# Patient Record
Sex: FEMALE | Race: WHITE | Employment: UNEMPLOYED | URBAN - METROPOLITAN AREA
[De-identification: names, ages, dates, MRNs, and addresses within clinical notes are randomized per-mention and may not be internally consistent; named-entity substitution may affect disease eponyms.]

---

## 2018-04-19 ENCOUNTER — HOSPITAL ENCOUNTER (OUTPATIENT)
Dept: MRI IMAGING | Age: 38
Discharge: HOME | End: 2018-04-19
Attending: INTERNAL MEDICINE
Payer: OTHER GOVERNMENT

## 2018-04-19 ENCOUNTER — HOSPITAL ENCOUNTER (OUTPATIENT)
Dept: ULTRASOUND IMAGING | Age: 38
Discharge: HOME | End: 2018-04-19
Attending: INTERNAL MEDICINE
Payer: OTHER GOVERNMENT

## 2018-04-19 DIAGNOSIS — K80.20 CHOLELITHIASIS: ICD-10-CM

## 2018-04-19 DIAGNOSIS — M79.672 LEFT FOOT PAIN: ICD-10-CM

## 2018-04-19 PROCEDURE — 73718 MRI LOWER EXTREMITY W/O DYE: CPT

## 2018-04-19 PROCEDURE — 76705 ECHO EXAM OF ABDOMEN: CPT

## 2018-07-03 ENCOUNTER — HOSPITAL ENCOUNTER (OUTPATIENT)
Dept: ULTRASOUND IMAGING | Age: 38
Discharge: HOME | End: 2018-07-03
Attending: INTERNAL MEDICINE
Payer: OTHER GOVERNMENT

## 2018-07-03 DIAGNOSIS — R33.9 URINARY RETENTION: ICD-10-CM

## 2018-07-03 PROCEDURE — 76857 US EXAM PELVIC LIMITED: CPT

## 2018-07-11 PROBLEM — E66.01 SEVERE OBESITY (BMI 35.0-39.9): Status: ACTIVE | Noted: 2018-07-11

## 2019-05-22 ENCOUNTER — HOSPITAL ENCOUNTER (OUTPATIENT)
Dept: ULTRASOUND IMAGING | Age: 39
Discharge: HOME | End: 2019-05-22
Attending: INTERNAL MEDICINE
Payer: OTHER GOVERNMENT

## 2019-05-22 DIAGNOSIS — R74.8 ACID PHOSPHATASE ELEVATED: ICD-10-CM

## 2019-05-22 PROCEDURE — 76705 ECHO EXAM OF ABDOMEN: CPT

## 2019-09-19 ENCOUNTER — HOSPITAL ENCOUNTER (OUTPATIENT)
Dept: CT IMAGING | Age: 39
Discharge: HOME | End: 2019-09-19
Attending: INTERNAL MEDICINE
Payer: OTHER GOVERNMENT

## 2019-09-19 DIAGNOSIS — L04.9 ACUTE LYMPHADENITIS: ICD-10-CM

## 2019-09-19 PROCEDURE — 70491 CT SOFT TISSUE NECK W/DYE: CPT

## 2019-09-19 PROCEDURE — 74011636320 HC RX REV CODE- 636/320: Performed by: INTERNAL MEDICINE

## 2019-09-19 RX ADMIN — IOPAMIDOL 79 ML: 612 INJECTION, SOLUTION INTRAVENOUS at 13:39

## 2019-09-30 ENCOUNTER — HOSPITAL ENCOUNTER (OUTPATIENT)
Dept: ULTRASOUND IMAGING | Age: 39
Discharge: HOME | End: 2019-09-30
Attending: INTERNAL MEDICINE
Payer: OTHER GOVERNMENT

## 2019-09-30 DIAGNOSIS — L04.9 ACUTE LYMPHADENITIS: ICD-10-CM

## 2019-09-30 PROCEDURE — 88173 CYTOPATH EVAL FNA REPORT: CPT

## 2019-09-30 PROCEDURE — 88185 FLOWCYTOMETRY/TC ADD-ON: CPT

## 2019-09-30 PROCEDURE — 38505 NEEDLE BIOPSY LYMPH NODES: CPT

## 2019-09-30 PROCEDURE — 74011000250 HC RX REV CODE- 250: Performed by: RADIOLOGY

## 2019-09-30 PROCEDURE — 88172 CYTP DX EVAL FNA 1ST EA SITE: CPT

## 2019-09-30 PROCEDURE — 88184 FLOWCYTOMETRY/ TC 1 MARKER: CPT

## 2019-09-30 PROCEDURE — 88305 TISSUE EXAM BY PATHOLOGIST: CPT

## 2019-09-30 RX ORDER — LIDOCAINE HYDROCHLORIDE 10 MG/ML
10 INJECTION INFILTRATION; PERINEURAL
Status: COMPLETED | OUTPATIENT
Start: 2019-09-30 | End: 2019-09-30

## 2019-09-30 RX ADMIN — LIDOCAINE HYDROCHLORIDE 2 ML: 10 INJECTION, SOLUTION INFILTRATION; PERINEURAL at 11:26

## 2019-12-26 ENCOUNTER — APPOINTMENT (OUTPATIENT)
Dept: GENERAL RADIOLOGY | Age: 39
End: 2019-12-26
Attending: PHYSICIAN ASSISTANT
Payer: OTHER GOVERNMENT

## 2019-12-26 ENCOUNTER — HOSPITAL ENCOUNTER (EMERGENCY)
Age: 39
Discharge: HOME | End: 2019-12-26
Attending: EMERGENCY MEDICINE
Payer: OTHER GOVERNMENT

## 2019-12-26 VITALS
RESPIRATION RATE: 16 BRPM | HEIGHT: 69 IN | SYSTOLIC BLOOD PRESSURE: 126 MMHG | WEIGHT: 250 LBS | TEMPERATURE: 96.9 F | OXYGEN SATURATION: 99 % | BODY MASS INDEX: 37.03 KG/M2 | HEART RATE: 70 BPM | DIASTOLIC BLOOD PRESSURE: 85 MMHG

## 2019-12-26 DIAGNOSIS — R07.89 ANTERIOR CHEST WALL PAIN: Primary | ICD-10-CM

## 2019-12-26 PROBLEM — D32.1 SPINAL MENINGIOMA (HCC): Status: ACTIVE | Noted: 2018-01-29

## 2019-12-26 PROBLEM — H02.422 MYOGENIC PTOSIS OF LEFT EYELID: Status: ACTIVE | Noted: 2019-12-26

## 2019-12-26 PROBLEM — R20.0 NUMBNESS AND TINGLING: Status: ACTIVE | Noted: 2017-08-28

## 2019-12-26 PROBLEM — G70.00 MYASTHENIA GRAVIS (HCC): Status: ACTIVE | Noted: 2019-12-26

## 2019-12-26 PROBLEM — R20.2 NUMBNESS AND TINGLING: Status: ACTIVE | Noted: 2017-08-28

## 2019-12-26 PROBLEM — G47.33 OSA ON CPAP: Status: ACTIVE | Noted: 2018-12-20

## 2019-12-26 PROBLEM — Z99.89 OSA ON CPAP: Status: ACTIVE | Noted: 2018-12-20

## 2019-12-26 PROBLEM — N92.0 MENORRHAGIA: Status: ACTIVE | Noted: 2019-12-26

## 2019-12-26 LAB
ALBUMIN SERPL-MCNC: 4 G/DL (ref 3.4–5)
ALBUMIN/GLOB SERPL: 1.1 {RATIO} (ref 0.8–1.7)
ALP SERPL-CCNC: 57 U/L (ref 45–117)
ALT SERPL-CCNC: 120 U/L (ref 13–56)
ANION GAP SERPL CALC-SCNC: 7 MMOL/L (ref 3–18)
AST SERPL-CCNC: 86 U/L (ref 10–38)
BASOPHILS # BLD: 0.1 K/UL (ref 0–0.1)
BASOPHILS NFR BLD: 1 % (ref 0–2)
BILIRUB SERPL-MCNC: 0.8 MG/DL (ref 0.2–1)
BUN SERPL-MCNC: 11 MG/DL (ref 7–18)
BUN/CREAT SERPL: 15 (ref 12–20)
CALCIUM SERPL-MCNC: 8.6 MG/DL (ref 8.5–10.1)
CHLORIDE SERPL-SCNC: 108 MMOL/L (ref 100–111)
CK MB CFR SERPL CALC: NORMAL % (ref 0–4)
CK MB SERPL-MCNC: <1 NG/ML (ref 5–25)
CK SERPL-CCNC: 67 U/L (ref 26–192)
CO2 SERPL-SCNC: 26 MMOL/L (ref 21–32)
CREAT SERPL-MCNC: 0.72 MG/DL (ref 0.6–1.3)
DIFFERENTIAL METHOD BLD: NORMAL
EOSINOPHIL # BLD: 0.2 K/UL (ref 0–0.4)
EOSINOPHIL NFR BLD: 3 % (ref 0–5)
ERYTHROCYTE [DISTWIDTH] IN BLOOD BY AUTOMATED COUNT: 13.6 % (ref 11.6–14.5)
GLOBULIN SER CALC-MCNC: 3.8 G/DL (ref 2–4)
GLUCOSE SERPL-MCNC: 96 MG/DL (ref 74–99)
HCT VFR BLD AUTO: 41.1 % (ref 35–45)
HGB BLD-MCNC: 13.3 G/DL (ref 12–16)
LYMPHOCYTES # BLD: 2.9 K/UL (ref 0.9–3.6)
LYMPHOCYTES NFR BLD: 41 % (ref 21–52)
MCH RBC QN AUTO: 28.7 PG (ref 24–34)
MCHC RBC AUTO-ENTMCNC: 32.4 G/DL (ref 31–37)
MCV RBC AUTO: 88.6 FL (ref 74–97)
MONOCYTES # BLD: 0.5 K/UL (ref 0.05–1.2)
MONOCYTES NFR BLD: 7 % (ref 3–10)
NEUTS SEG # BLD: 3.4 K/UL (ref 1.8–8)
NEUTS SEG NFR BLD: 48 % (ref 40–73)
PLATELET # BLD AUTO: 354 K/UL (ref 135–420)
PMV BLD AUTO: 9.6 FL (ref 9.2–11.8)
POTASSIUM SERPL-SCNC: 3.8 MMOL/L (ref 3.5–5.5)
PROT SERPL-MCNC: 7.8 G/DL (ref 6.4–8.2)
RBC # BLD AUTO: 4.64 M/UL (ref 4.2–5.3)
SODIUM SERPL-SCNC: 141 MMOL/L (ref 136–145)
TROPONIN I SERPL-MCNC: <0.02 NG/ML (ref 0–0.04)
WBC # BLD AUTO: 7.1 K/UL (ref 4.6–13.2)

## 2019-12-26 PROCEDURE — 82550 ASSAY OF CK (CPK): CPT

## 2019-12-26 PROCEDURE — 85025 COMPLETE CBC W/AUTO DIFF WBC: CPT

## 2019-12-26 PROCEDURE — 93005 ELECTROCARDIOGRAM TRACING: CPT

## 2019-12-26 PROCEDURE — 74011250637 HC RX REV CODE- 250/637: Performed by: PHYSICIAN ASSISTANT

## 2019-12-26 PROCEDURE — 99284 EMERGENCY DEPT VISIT MOD MDM: CPT

## 2019-12-26 PROCEDURE — 71045 X-RAY EXAM CHEST 1 VIEW: CPT

## 2019-12-26 PROCEDURE — 80053 COMPREHEN METABOLIC PANEL: CPT

## 2019-12-26 RX ORDER — DIAZEPAM 5 MG/1
5 TABLET ORAL
Qty: 9 TAB | Refills: 0 | Status: SHIPPED | OUTPATIENT
Start: 2019-12-26 | End: 2019-12-26

## 2019-12-26 RX ORDER — DIAZEPAM 5 MG/1
5 TABLET ORAL
Qty: 9 TAB | Refills: 0 | Status: SHIPPED | OUTPATIENT
Start: 2019-12-26

## 2019-12-26 RX ORDER — DIAZEPAM 5 MG/1
5 TABLET ORAL
Status: COMPLETED | OUTPATIENT
Start: 2019-12-26 | End: 2019-12-26

## 2019-12-26 RX ADMIN — DIAZEPAM 5 MG: 5 TABLET ORAL at 21:54

## 2019-12-27 PROBLEM — R33.9 INCOMPLETE BLADDER EMPTYING: Status: ACTIVE | Noted: 2019-12-27

## 2019-12-27 PROBLEM — N31.9 NEUROGENIC BLADDER: Status: ACTIVE | Noted: 2019-12-27

## 2019-12-27 LAB
ATRIAL RATE: 80 BPM
CALCULATED P AXIS, ECG09: 13 DEGREES
CALCULATED R AXIS, ECG10: 20 DEGREES
CALCULATED T AXIS, ECG11: 33 DEGREES
DIAGNOSIS, 93000: NORMAL
P-R INTERVAL, ECG05: 160 MS
Q-T INTERVAL, ECG07: 384 MS
QRS DURATION, ECG06: 94 MS
QTC CALCULATION (BEZET), ECG08: 442 MS
VENTRICULAR RATE, ECG03: 80 BPM

## 2019-12-27 NOTE — ED TRIAGE NOTES
Pt arrives with c/o chest spasm that lasted 5 minutes that moved up into jaw x 2.today at 1815 today. Pt states feeling better now. Pt states same happened 3 weeks ago and passed out. Hx of Mysthenia Gravis.

## 2019-12-27 NOTE — DISCHARGE INSTRUCTIONS

## 2019-12-27 NOTE — ED NOTES
I have reviewed discharge instructions with the patient. The patient and spouse verbalized understanding.   Patient armband removed and shredded

## 2019-12-27 NOTE — ED PROVIDER NOTES
EMERGENCY DEPARTMENT HISTORY AND PHYSICAL EXAM    Date: 12/26/2019  Patient Name: Guerrero Lui    History of Presenting Illness     Chief Complaint   Patient presents with    Spasms     chest         History Provided By: Patient        Additional History (Context): Guerrero Lui is a 35 y.o. female with Previous history of myasthenia gravis currently managed by her neurologist, obstructive sleep apnea and prior history of spinal meningioma who presents with a complaint of sudden onset anterior chest wall pain which radiated to her midline neck lasting approximately 5 minutes and resolved at 1800 hrs. tonight. Patient does report a history of muscle spasms however, she feels this episode was stronger than prior muscle spasms. Patient takes baclofen 3 times daily. She has not taken her dose since this episode. At time of assessment the patient symptoms have completely resolved. She denies diaphoreses during this episode however, she does report shortness of breath associated with this intense chest wall pain. Patient denies trauma to the chest, recent cough, fever, myalgias, or chills. PCP: Aylin Centeno MD    Current Outpatient Medications   Medication Sig Dispense Refill    diazePAM (VALIUM) 5 mg tablet Take 1 Tab by mouth daily as needed (severe muscle spasm). 9 Tab 0    buPROPion XL (WELLBUTRIN XL) 300 mg XL tablet take 1 tablet by mouth once daily  0    pyridostigmine (MESTINON) 60 mg tablet START WITH take 1/2 tablet by mouth three times a day for 1 week . ..  (REFER TO PRESCRIPTION NOTES).   0    prazosin (MINIPRESS) 1 mg capsule   0    diclofenac (VOLTAREN) 1 % gel   0    baclofen (LIORESAL) 20 mg tablet   0    cetirizine (ZYRTEC) 10 mg tablet       clobetasol (OLUX) 0.05 % topical foam clobetasol 0.05 % topical foam      EPINEPHrine (EPIPEN 2-YONI) 0.3 mg/0.3 mL injection EpiPen 2-Yoni 0.3 mg/0.3 mL injection, auto-injector      ergocalciferol (ERGOCALCIFEROL) 50,000 unit capsule       fluticasone (FLONASE) 50 mcg/actuation nasal spray       gabapentin (NEURONTIN) 600 mg tablet   0    tranexamic acid (LYSTEDA) 650 mg tab tablet Take 2 tablets 3 times a day by oral route for 5 days.  meloxicam (MOBIC) 15 mg tablet   0    multivitamin with minerals (MULTIVITAMIN & MINERAL FORMULA PO) multivitamin      PAZEO 0.7 % drop       carboxymethylcellulose sodium (REFRESH TEARS) 0.5 % drop ophthalmic solution instill 1 drop into affected eye if needed         Past History     Past Medical History:  Past Medical History:   Diagnosis Date    Bladder wall thickening     Clonus     Ill-defined condition     spinal cord  compression    Incomplete bladder emptying     Myasthenia gravis (HCC)     Neurogenic bladder     Obesity     MARIAH on CPAP     Stress incontinence     Vitamin D deficiency        Past Surgical History:  Past Surgical History:   Procedure Laterality Date    HX  SECTION      x3    HX OTHER SURGICAL  2017    C-4 meningioma resection    HX WISDOM TEETH EXTRACTION         Family History:  Family History   Problem Relation Age of Onset    Hypertension Mother     Seizures Sister        Social History:  Social History     Tobacco Use    Smoking status: Former Smoker    Smokeless tobacco: Former User   Substance Use Topics    Alcohol use: Not Currently     Frequency: Never    Drug use: No       Allergies: Allergies   Allergen Reactions    Other Food Shortness of Breath     Milk protein, casein, whey--throat swelling         Review of Systems   Review of Systems  Review of Systems   Constitutional: Negative for fatigue and fever. HENT: Negative for congestion. Respiratory: Negative for cough, episodic SOB at the same time of anterior chest wall pain. Cardiovascular: Positive for anterior wall chest pain of sudden onset (now resolved). Gastrointestinal: Negative for abdominal pain, diarrhea, nausea and vomiting.    Genitourinary: Negative for difficulty urinating and dysuria. Musculoskeletal: Negative joint pain, joint swelling, recent injury. Skin: Negative for wound. Patient denies diaphoreses patient denies diaphoreses  Neurological: Negative for dizziness and headaches. All other systems reviewed and are negative. All Other Systems Negative  Physical Exam     Vitals:    12/26/19 1900 12/26/19 1906 12/26/19 2128   BP: 126/85     Pulse: 70     Resp: 16     Temp: 96.9 °F (36.1 °C)     SpO2: 99%  99%   Weight:  113.4 kg (250 lb)    Height:  5' 9\" (1.753 m)      Physical Exam     Constitutional: Pt is oriented to person, place, and time. Pt appears well-developed and well-nourished. HENT:   Head: Normocephalic and atraumatic. Mouth/Throat: Oropharynx is clear and moist.   Eyes: Pupils are equal, round, and reactive to light. Neck: Normal range of motion. Neck supple. Cardiovascular: Normal rate, regular rhythm and normal heart sounds. No murmur heard. Pulmonary/Chest: Effort normal and breath sounds normal. No respiratory distress. No wheezes or rales. Chest wall pain is not reproducible but resolved. Abdominal: Soft. no distension and no mass. There is no tenderness. There is no rebound and no guarding. No Childress sign. Musculoskeletal: Normal range of motion. No edema or deformity. No edema, calor or erythema of the bilateral calves. Neurological: Pt is alert and oriented to person, place, and time   Skin: Skin is warm and dry. No diaphoreses.   Psychiatric: Pt has a normal mood and affect;  behavior is normal. Judgment and thought content normal.           Diagnostic Study Results     Labs -     Recent Results (from the past 12 hour(s))   CARDIAC PANEL,(CK, CKMB & TROPONIN)    Collection Time: 12/26/19  7:52 PM   Result Value Ref Range    CK 67 26 - 192 U/L    CK - MB <1.0 <3.6 ng/ml    CK-MB Index  0.0 - 4.0 %     CALCULATION NOT PERFORMED WHEN RESULT IS BELOW LINEAR LIMIT    Troponin-I, QT <0.02 0.0 - 0.045 NG/ML   CBC WITH AUTOMATED DIFF    Collection Time: 12/26/19  7:52 PM   Result Value Ref Range    WBC 7.1 4.6 - 13.2 K/uL    RBC 4.64 4.20 - 5.30 M/uL    HGB 13.3 12.0 - 16.0 g/dL    HCT 41.1 35.0 - 45.0 %    MCV 88.6 74.0 - 97.0 FL    MCH 28.7 24.0 - 34.0 PG    MCHC 32.4 31.0 - 37.0 g/dL    RDW 13.6 11.6 - 14.5 %    PLATELET 662 335 - 679 K/uL    MPV 9.6 9.2 - 11.8 FL    NEUTROPHILS 48 40 - 73 %    LYMPHOCYTES 41 21 - 52 %    MONOCYTES 7 3 - 10 %    EOSINOPHILS 3 0 - 5 %    BASOPHILS 1 0 - 2 %    ABS. NEUTROPHILS 3.4 1.8 - 8.0 K/UL    ABS. LYMPHOCYTES 2.9 0.9 - 3.6 K/UL    ABS. MONOCYTES 0.5 0.05 - 1.2 K/UL    ABS. EOSINOPHILS 0.2 0.0 - 0.4 K/UL    ABS. BASOPHILS 0.1 0.0 - 0.1 K/UL    DF AUTOMATED     METABOLIC PANEL, COMPREHENSIVE    Collection Time: 12/26/19  7:52 PM   Result Value Ref Range    Sodium 141 136 - 145 mmol/L    Potassium 3.8 3.5 - 5.5 mmol/L    Chloride 108 100 - 111 mmol/L    CO2 26 21 - 32 mmol/L    Anion gap 7 3.0 - 18 mmol/L    Glucose 96 74 - 99 mg/dL    BUN 11 7.0 - 18 MG/DL    Creatinine 0.72 0.6 - 1.3 MG/DL    BUN/Creatinine ratio 15 12 - 20      GFR est AA >60 >60 ml/min/1.73m2    GFR est non-AA >60 >60 ml/min/1.73m2    Calcium 8.6 8.5 - 10.1 MG/DL    Bilirubin, total 0.8 0.2 - 1.0 MG/DL    ALT (SGPT) 120 (H) 13 - 56 U/L    AST (SGOT) 86 (H) 10 - 38 U/L    Alk. phosphatase 57 45 - 117 U/L    Protein, total 7.8 6.4 - 8.2 g/dL    Albumin 4.0 3.4 - 5.0 g/dL    Globulin 3.8 2.0 - 4.0 g/dL    A-G Ratio 1.1 0.8 - 1.7         Radiologic Studies -   XR CHEST PORT    (Results Pending)     CT Results  (Last 48 hours)    None        CXR Results  (Last 48 hours)    None            Medical Decision Making   I am the first provider for this patient. I reviewed the vital signs, available nursing notes, past medical history, past surgical history, family history and social history. Vital Signs-Reviewed the patient's vital signs.        Comparison:    Records Reviewed: Nursing Notes    Procedures:  Procedures    Provider Notes (Medical Decision Making):   Patient's heart score = 0. Perc score =0. Onset of the patient's symptoms and resolved symptoms minutes after onset I suspect this was chest wall pain muscular skeletal in origin. Patient has a history of muscle spasms and takes baclofen 3 times daily I may replace this with Valium but only once a day if needed. MED RECONCILIATION:  No current facility-administered medications for this encounter. Current Outpatient Medications   Medication Sig    diazePAM (VALIUM) 5 mg tablet Take 1 Tab by mouth daily as needed (severe muscle spasm).  buPROPion XL (WELLBUTRIN XL) 300 mg XL tablet take 1 tablet by mouth once daily    pyridostigmine (MESTINON) 60 mg tablet START WITH take 1/2 tablet by mouth three times a day for 1 week . ..  (REFER TO PRESCRIPTION NOTES).  prazosin (MINIPRESS) 1 mg capsule     diclofenac (VOLTAREN) 1 % gel     baclofen (LIORESAL) 20 mg tablet     cetirizine (ZYRTEC) 10 mg tablet     clobetasol (OLUX) 0.05 % topical foam clobetasol 0.05 % topical foam    EPINEPHrine (EPIPEN 2-YONI) 0.3 mg/0.3 mL injection EpiPen 2-Yoni 0.3 mg/0.3 mL injection, auto-injector    ergocalciferol (ERGOCALCIFEROL) 50,000 unit capsule     fluticasone (FLONASE) 50 mcg/actuation nasal spray     gabapentin (NEURONTIN) 600 mg tablet     tranexamic acid (LYSTEDA) 650 mg tab tablet Take 2 tablets 3 times a day by oral route for 5 days.  meloxicam (MOBIC) 15 mg tablet     multivitamin with minerals (MULTIVITAMIN & MINERAL FORMULA PO) multivitamin    PAZEO 0.7 % drop     carboxymethylcellulose sodium (REFRESH TEARS) 0.5 % drop ophthalmic solution instill 1 drop into affected eye if needed       Disposition:  Home    DISCHARGE NOTE:     Pt has been reexamined. Patient has no new complaints, changes, or physical findings. Care plan outlined and precautions discussed. Results of exam and labs were reviewed with the patient.  All medications were reviewed with the patient; will d/c home with valium only as needed and as a substitute for baclofen. All of pt's questions and concerns were addressed. Patient was instructed and agrees to follow up with primary care, as well as to return to the ED upon further deterioration. Patient is ready to go home. Follow-up Information     Follow up With Specialties Details Why Contact Info    Bennie Corrales MD Internal Medicine Schedule an appointment as soon as possible for a visit As needed [address] [place] [address] Internal Medicine [address] [place] [address] [phone]      [facility] EMERGENCY DEPT Emergency Medicine  If symptoms worsen [address]  [phone]          Discharge Medication List as of 12/26/2019  9:47 PM      START taking these medications    Details   diazePAM (VALIUM) 5 mg tablet Take 1 Tab by mouth three (3) times daily as needed (severe muscle spasm). Max Daily Amount: 15 mg., Print, Disp-9 Tab, R-0         CONTINUE these medications which have NOT CHANGED    Details   buPROPion XL (WELLBUTRIN XL) 300 mg XL tablet take 1 tablet by mouth once daily, Historical Med, R-0      pyridostigmine (MESTINON) 60 mg tablet START WITH take 1/2 tablet by mouth three times a day for 1 week . ..  (REFER TO PRESCRIPTION NOTES). , Historical Med, R-0      prazosin (MINIPRESS) 1 mg capsule Historical Med, R-0      diclofenac (VOLTAREN) 1 % gel Historical Med, R-0      baclofen (LIORESAL) 20 mg tablet Historical Med, R-0      cetirizine (ZYRTEC) 10 mg tablet Historical Med      clobetasol (OLUX) 0.05 % topical foam clobetasol 0.05 % topical foam, Historical Med      EPINEPHrine (EPIPEN 2-YONI) 0.3 mg/0.3 mL injection EpiPen 2-Yoni 0.3 mg/0.3 mL injection, auto-injector, Historical Med      ergocalciferol (ERGOCALCIFEROL) 50,000 unit capsule Historical Med      fluticasone (FLONASE) 50 mcg/actuation nasal spray Historical Med      gabapentin (NEURONTIN) 600 mg tablet Historical Med, R-0      tranexamic acid (LYSTEDA) 650 mg tab tablet Take 2 tablets 3 times a day by oral route for 5 days. , Historical Med      meloxicam (MOBIC) 15 mg tablet Historical Med, R-0      multivitamin with minerals (MULTIVITAMIN & MINERAL FORMULA PO) multivitamin, Historical Med      PAZEO 0.7 % drop Historical Med, ROSA      carboxymethylcellulose sodium (REFRESH TEARS) 0.5 % drop ophthalmic solution instill 1 drop into affected eye if needed, Historical Med                 Diagnosis     Clinical Impression:   1.  Anterior chest wall pain

## 2020-07-01 ENCOUNTER — OFFICE VISIT (OUTPATIENT)
Dept: SURGERY | Age: 40
End: 2020-07-01

## 2020-07-01 VITALS
WEIGHT: 263 LBS | TEMPERATURE: 98.3 F | BODY MASS INDEX: 39.86 KG/M2 | OXYGEN SATURATION: 98 % | HEIGHT: 68 IN | SYSTOLIC BLOOD PRESSURE: 123 MMHG | DIASTOLIC BLOOD PRESSURE: 83 MMHG | HEART RATE: 120 BPM

## 2020-07-01 DIAGNOSIS — E66.01 MORBID OBESITY WITH BMI OF 40.0-44.9, ADULT (HCC): Primary | ICD-10-CM

## 2020-07-01 NOTE — PROGRESS NOTES
Consult    Patient: Latricia Álvarez MRN: [id number]  SSN: [id number]    YOB: 1980  Age: 38 y.o. Sex: female      Initial  Consultation for Bariatric Surgery     Latricia Álvarez is a 41-year-old white female who presents for discussion of surgical options available for the definitive management of her clinically severe obesity. Onset of obesity: Age 35 weighing 230 pounds on a 5 foot 9 inch frame. Weight at age 24: 125 pounds on a 5 foot 9 inch frame. Maximum weight: 275 pounds on a 5 foot 9 inch frame in 2019  Pattern/progression of weight gain: Slowly progressive interrupted by dietary weight loss followed by regain of the lost weight as well as additional weight thus exhibiting the yoyo effect after maximum loss of 275 pounds in 2019  Max medical weight loss attempts: Multiple unsupervised and supervised weight loss trials with maximal loss occurring in  losing 30 pounds over 6 months. Comorbidities: Gastroesophageal reflux disease, obstructive sleep apnea, weight related arthropathy-hips  Current weight: 263 BMI: 40  Ideal body weight: 146. Excess body weight: 117  Estimated postsurgical weight loss: 94  Postsurgical goal weight: 160  Allergies: No known drug allergies  Current medications: See medication list  Past medical history:  1. Clinically severe obesity with body mass index of 40 and obese related comorbidities of gastroesophageal reflux disease, obstructive sleep apnea, weight related arthropathy-hips  2. Depression/anxiety  3. History of myasthenia gravis  4. C7 spinal meningioma status post resection 2017 with neurogenic bladder  5. Allergic rhinitis  Past surgical history:  1. [place] tooth extractions   2.  sections times 3/2009, ,   3.   Resection of C7 spinal meningioma   Social history:  Tobacco: Quit smoking  with a prior history of 1 pack of cigarettes daily for 10 years  Alcohol: 1 ounce monthly  Family history: Mother 79-hypertension  Father  61-clinically severe obesity, influenza  One half sister 56-Parkinson's disease  One half sister 54-rheumatoid arthritis  Brother 38-clinically severe obesity with Gastrosoft reflux disease and liver disease of unknown etiology    Allergies   Allergen Reactions    Other Food Shortness of Breath     Milk protein, casein, whey--throat swelling       Current Outpatient Medications on File Prior to Visit   Medication Sig Dispense Refill    diazePAM (VALIUM) 5 mg tablet Take 1 Tab by mouth daily as needed (severe muscle spasm). 9 Tab 0    buPROPion XL (WELLBUTRIN XL) 300 mg XL tablet take 1 tablet by mouth once daily  0    pyridostigmine (MESTINON) 60 mg tablet START WITH take 1/2 tablet by mouth three times a day for 1 week . ..  (REFER TO PRESCRIPTION NOTES). 0    diclofenac (VOLTAREN) 1 % gel   0    cetirizine (ZYRTEC) 10 mg tablet       EPINEPHrine (EPIPEN 2-YONI) 0.3 mg/0.3 mL injection EpiPen 2-Yoni 0.3 mg/0.3 mL injection, auto-injector      ergocalciferol (ERGOCALCIFEROL) 50,000 unit capsule       fluticasone (FLONASE) 50 mcg/actuation nasal spray       gabapentin (NEURONTIN) 600 mg tablet   0    tranexamic acid (LYSTEDA) 650 mg tab tablet Take 2 tablets 3 times a day by oral route for 5 days.  multivitamin with minerals (MULTIVITAMIN & MINERAL FORMULA PO) multivitamin      PAZEO 0.7 % drop       carboxymethylcellulose sodium (REFRESH TEARS) 0.5 % drop ophthalmic solution instill 1 drop into affected eye if needed      prazosin (MINIPRESS) 1 mg capsule   0    baclofen (LIORESAL) 20 mg tablet   0    clobetasol (OLUX) 0.05 % topical foam clobetasol 0.05 % topical foam      meloxicam (MOBIC) 15 mg tablet   0     No current facility-administered medications on file prior to visit.         Past Medical History:   Diagnosis Date    Bladder wall thickening     Clonus     Ill-defined condition     spinal cord  compression    Incomplete bladder emptying     Myasthenia gravis (HCC)     Neurogenic bladder     Obesity     MARIAH on CPAP     Stress incontinence     Vitamin D deficiency        Past Surgical History:   Procedure Laterality Date    HX  SECTION      x3    HX ORTHOPAEDIC      HX OTHER SURGICAL  2017    C-4 meningioma resection    HX WISDOM TEETH EXTRACTION         Social History     Tobacco Use    Smoking status: Former Smoker    Smokeless tobacco: Former User   Substance Use Topics    Alcohol use: Yes     Frequency: Never     Comment: occ    Drug use: No       Family History   Problem Relation Age of Onset    Hypertension Mother     Seizures Sister          Review of Systems:      General: Denies fevers, chills, night sweats, fatigue, weight loss, or weight gain. HEENT: Denies changes in auditory or visual acuity, recurrent pharyngitis, epistaxis, chronic rhinorrhea, vertigo    Respiratory: Denies increasing shortness of breath, productive cough, hemoptysis    Cardiac: Denies known history of cardiac disease, heart murmur, palpitations    GI: Denies dysphagia, recurrent emesis, hematemesis, changes in bowel habits, hematochezia, melena    : Denies hematuria frequency urgency dysuria    Musculoskeletal: Denies fractures, dislocations    Neurologic: Denies history of CVA, paralysis paresthesias, recurrent cephalgia, seizures    Endocrine: Denies polyuria, polydipsia, polyphagia, heat and cold intolerance    Lymph/heme: Denies a history of malignancy, anemia, bruising, blood transfusions    Integumentary: Negative for dermatitis         Physical Exam    Visit Vitals  /83 (BP 1 Location: Right arm, BP Patient Position: Sitting)   Pulse (!) 120   Temp 98.3 °F (36.8 °C)   Ht 5' 8\" (1.727 m)   Wt 119.3 kg (263 lb)   SpO2 98%   BMI 39.99 kg/m²       Nursing note reviewed. General: Clinically severely obese in no acute distress, nontoxic in appearance.   Head: Normocephalic, atraumatic  Mouth: Clear, no overt lesions, oral mucosa is pink and moist.  Neck: Supple, no masses, no adenopathy or carotid bruits, trachea midline  Resp: Clear to auscultation bilaterally, no wheezing, rhonchi, or rales, excursions normal and symmetrical.  Cardio: Regular rate and rhythm, no murmurs, clicks, gallops, or rubs. Abdomen: Obese, soft, nontender, nondistended, normoactive bowel sounds, no hernias. Extremities: Warm, well perfused, no tenderness or swelling, normal gait/station, without edema or varicosities  Neuro: Sensation and strength grossly intact and symmetrical.  Psych: Alert and oriented to person, place, and time. Impression/Plan:    Clinically severe obesity with body mass index of 40 and obesity related comorbidities of Gastrosoft reflux disease, obstructive sleep apnea, weight related arthropathy-hips who would benefit from bariatric surgery. We have had an extensive discussion with regard to the risks, benefits and likely outcomes of the operation. We've discussed the restrictive and malabsorptive nature of the gastric bypass and compared and contrasted with the sleeve gastrectomy. The patient understands the likelihood of losing approximately 80% of their excess weight in 12 to 18 months. The patient also understands the risks including but not limited to bleeding, infection, need for reoperation, ulcers, leaks and strictures, bowel obstruction secondary to adhesions and internal hernias, DVT, PE, heart attack, stroke, and death. Patient also understands risks of inadequate weight loss, excess weight loss, vitamin insufficiency, protein malnutrition, excess skin, and loss of hair. We have reviewed the components of a successful postoperative course including requirement for a high protein, low carbohydrate diet, 60 oz a day of zero calorie liquids, daily vitamin supplementation, daily exercise, regular follow-up, and participation in support groups.  At this time we will enroll the patient in our bariatric program, undertake routine laboratory evaluation, chest X-ray, EKG, possible UGI and evaluation by  nutritionist as well as psychologist and pending their satisfactory completion of the preop evaluation, plan to pursue laparoscopic potentially open gastric bypass to achieve definitive durable weight loss on a personal level with expected resolution of obesity related comorbidities

## 2020-07-07 ENCOUNTER — DOCUMENTATION ONLY (OUTPATIENT)
Dept: SURGERY | Age: 40
End: 2020-07-07

## 2020-07-07 NOTE — PROGRESS NOTES
[facility]  [address] [facility] [place], [address] [place] [address], [id number]    Patient's Name: Victorina Khan   Age: 38 y.o. YOB: 1980   Sex: female    Date:   7/7/2020           Session: 1 of  3   Surgeon:   Dr. Daiva Aschoff     Height: 5'8\"   Weight:    263      Lbs. Starting Weight:   263   Lbs. BMI: 39       Do you smoke? no    Alcohol intake:    I drink occasionally  Very rarely. Class Guidelines    Guidelines are reviewed with patient at the start of every class. 1. Patient understands that weight loss trial classes must be consecutive. Patient understands if they miss a class, it is their responsibility to contact me to reschedule class. I will reach out to patient after their first no show. 2.  Patient understands the expectations that weight maintenance/weight loss is expected during the classes. Failure to demonstrate changes may result in one extra month of weight loss trial, followed by going back to see the surgeon. Patient understands that they CAN NOT gain any weight during the weight loss trial.  Gaining weight will result in extra classes. 3. Patient is also instructed to be doing their labs, blood work, psych visit, support group and any other test that the surgeon has used while they are working on their weight loss trial.  4.  Patient was instructed to bring their blue binder to every class and appointment. Eating Habits and Behaviors    Today in class, we started talking about the key diet principles. We first focused on stopping liquid calories. Patient was also educated on carbohydrates. Patient was instructed to start cutting out bread, rice, and pasta from the diet and start focusing more on meat and vegetables. I then gave a power point, which focused on Label Reading. In class, I gave patients a labels and we worked through a series of questions to help patients have a better understanding of label reading.   Patient was instructed to review the serving size. Patient was encouraged to focus on protein and carbohydrates. We also did a few label reading activities to help the patient become more familiar with label reading. Patient's current diet habits include:   2-3 meals consisting of a a protein source, a starch option and sometimes a vegetable options. For snacks the patient often chooses grapes, greek yogurt with strawberries, crackers. Physical Activity/Exercise    Comments: We talked about exercise. Patient was given reasons of why exercise is so important and how that can help with their long-term success. I have encouraged patient to get a support system to help with the activity. Currently for activity, patient is doing more walking. Behavior Modification       Comments:  Behavior modifications were reinforced. This included not eating in front of the TV, which could lead to bigger portions and eating when one is not hungry. We also talked about the importance of eating 3 meals per day. Patient was encouraged to food journal to keep their daily carbohydrates less than 30 grams per meal.      Goals that patient wants to work on includes:  1. Starting breakfast.    2.  Get protein in.  3.  Drink 64 oz water.         Bere Ivy RD

## 2020-07-08 ENCOUNTER — HOSPITAL ENCOUNTER (OUTPATIENT)
Dept: LAB | Age: 40
Discharge: HOME | End: 2020-07-08
Payer: OTHER GOVERNMENT

## 2020-07-08 ENCOUNTER — HOSPITAL ENCOUNTER (OUTPATIENT)
Dept: PREADMISSION TESTING | Age: 40
Discharge: HOME | End: 2020-07-08
Payer: OTHER GOVERNMENT

## 2020-07-08 ENCOUNTER — HOSPITAL ENCOUNTER (OUTPATIENT)
Dept: GENERAL RADIOLOGY | Age: 40
Discharge: HOME | End: 2020-07-08
Payer: OTHER GOVERNMENT

## 2020-07-08 DIAGNOSIS — E66.01 MORBID OBESITY WITH BMI OF 40.0-44.9, ADULT (HCC): ICD-10-CM

## 2020-07-08 LAB
25(OH)D3 SERPL-MCNC: 42.8 NG/ML (ref 30–100)
ALBUMIN SERPL-MCNC: 4 G/DL (ref 3.4–5)
ALBUMIN/GLOB SERPL: 1 {RATIO} (ref 0.8–1.7)
ALP SERPL-CCNC: 51 U/L (ref 45–117)
ALT SERPL-CCNC: 32 U/L (ref 13–56)
ANION GAP SERPL CALC-SCNC: 9 MMOL/L (ref 3–18)
APPEARANCE UR: ABNORMAL
AST SERPL-CCNC: 32 U/L (ref 10–38)
ATRIAL RATE: 70 BPM
BACTERIA URNS QL MICRO: ABNORMAL /HPF
BILIRUB SERPL-MCNC: 1.4 MG/DL (ref 0.2–1)
BILIRUB UR QL: ABNORMAL
BUN SERPL-MCNC: 13 MG/DL (ref 7–18)
BUN/CREAT SERPL: 16 (ref 12–20)
CALCIUM SERPL-MCNC: 9 MG/DL (ref 8.5–10.1)
CALCULATED P AXIS, ECG09: 15 DEGREES
CALCULATED R AXIS, ECG10: 29 DEGREES
CALCULATED T AXIS, ECG11: 29 DEGREES
CHLORIDE SERPL-SCNC: 106 MMOL/L (ref 100–111)
CHOLEST SERPL-MCNC: 193 MG/DL
CO2 SERPL-SCNC: 24 MMOL/L (ref 21–32)
COLOR UR: ABNORMAL
CREAT SERPL-MCNC: 0.82 MG/DL (ref 0.6–1.3)
DIAGNOSIS, 93000: NORMAL
EPITH CASTS URNS QL MICRO: ABNORMAL /LPF (ref 0–5)
ERYTHROCYTE [DISTWIDTH] IN BLOOD BY AUTOMATED COUNT: 14.3 % (ref 11.6–14.5)
FERRITIN SERPL-MCNC: 19 NG/ML (ref 8–388)
FOLATE SERPL-MCNC: 16 NG/ML (ref 3.1–17.5)
GLOBULIN SER CALC-MCNC: 3.9 G/DL (ref 2–4)
GLUCOSE SERPL-MCNC: 130 MG/DL (ref 74–99)
GLUCOSE UR STRIP.AUTO-MCNC: NEGATIVE MG/DL
HCT VFR BLD AUTO: 41.6 % (ref 35–45)
HDLC SERPL-MCNC: 46 MG/DL (ref 40–60)
HDLC SERPL: 4.2 {RATIO} (ref 0–5)
HGB BLD-MCNC: 13.5 G/DL (ref 12–16)
HGB UR QL STRIP: NEGATIVE
IRON SERPL-MCNC: 83 UG/DL (ref 50–175)
KETONES UR QL STRIP.AUTO: ABNORMAL MG/DL
LDLC SERPL CALC-MCNC: 111 MG/DL (ref 0–100)
LEUKOCYTE ESTERASE UR QL STRIP.AUTO: NEGATIVE
LIPID PROFILE,FLP: ABNORMAL
MCH RBC QN AUTO: 28 PG (ref 24–34)
MCHC RBC AUTO-ENTMCNC: 32.5 G/DL (ref 31–37)
MCV RBC AUTO: 86.1 FL (ref 74–97)
NITRITE UR QL STRIP.AUTO: NEGATIVE
P-R INTERVAL, ECG05: 166 MS
PH UR STRIP: 5.5 [PH] (ref 5–8)
PLATELET # BLD AUTO: 381 K/UL (ref 135–420)
PMV BLD AUTO: 9.9 FL (ref 9.2–11.8)
POTASSIUM SERPL-SCNC: 4.3 MMOL/L (ref 3.5–5.5)
PROT SERPL-MCNC: 7.9 G/DL (ref 6.4–8.2)
PROT UR STRIP-MCNC: NEGATIVE MG/DL
Q-T INTERVAL, ECG07: 386 MS
QRS DURATION, ECG06: 92 MS
QTC CALCULATION (BEZET), ECG08: 416 MS
RBC # BLD AUTO: 4.83 M/UL (ref 4.2–5.3)
RBC #/AREA URNS HPF: NEGATIVE /HPF (ref 0–5)
SODIUM SERPL-SCNC: 139 MMOL/L (ref 136–145)
SP GR UR REFRACTOMETRY: 1.03 (ref 1–1.03)
TRIGL SERPL-MCNC: 180 MG/DL (ref ?–150)
TSH SERPL DL<=0.05 MIU/L-ACNC: 2.13 UIU/ML (ref 0.36–3.74)
UROBILINOGEN UR QL STRIP.AUTO: 1 EU/DL (ref 0.2–1)
VENTRICULAR RATE, ECG03: 70 BPM
VIT B12 SERPL-MCNC: 235 PG/ML (ref 211–911)
VLDLC SERPL CALC-MCNC: 36 MG/DL
WBC # BLD AUTO: 6.5 K/UL (ref 4.6–13.2)
WBC URNS QL MICRO: ABNORMAL /HPF (ref 0–4)

## 2020-07-08 PROCEDURE — 84425 ASSAY OF VITAMIN B-1: CPT

## 2020-07-08 PROCEDURE — 82728 ASSAY OF FERRITIN: CPT

## 2020-07-08 PROCEDURE — 93005 ELECTROCARDIOGRAM TRACING: CPT

## 2020-07-08 PROCEDURE — 36415 COLL VENOUS BLD VENIPUNCTURE: CPT

## 2020-07-08 PROCEDURE — 84443 ASSAY THYROID STIM HORMONE: CPT

## 2020-07-08 PROCEDURE — 82607 VITAMIN B-12: CPT

## 2020-07-08 PROCEDURE — 80061 LIPID PANEL: CPT

## 2020-07-08 PROCEDURE — 85027 COMPLETE CBC AUTOMATED: CPT

## 2020-07-08 PROCEDURE — 82306 VITAMIN D 25 HYDROXY: CPT

## 2020-07-08 PROCEDURE — 86677 HELICOBACTER PYLORI ANTIBODY: CPT

## 2020-07-08 PROCEDURE — 80053 COMPREHEN METABOLIC PANEL: CPT

## 2020-07-08 PROCEDURE — 81001 URINALYSIS AUTO W/SCOPE: CPT

## 2020-07-08 PROCEDURE — 71046 X-RAY EXAM CHEST 2 VIEWS: CPT

## 2020-07-08 PROCEDURE — 83540 ASSAY OF IRON: CPT

## 2020-07-09 LAB
H PYLORI IGA SER-ACNC: <9 UNITS (ref 0–8.9)
H PYLORI IGM SER-ACNC: <9 UNITS (ref 0–8.9)

## 2020-07-11 LAB — VIT B1 BLD-SCNC: 138.7 NMOL/L (ref 66.5–200)

## 2020-08-11 ENCOUNTER — DOCUMENTATION ONLY (OUTPATIENT)
Dept: SURGERY | Age: 40
End: 2020-08-11

## 2020-08-11 NOTE — PROGRESS NOTES
[facility]  [id number] [place] [address], [address] [place] [address], [address]    Patient's Name: Abhay Gardner   Age: 38 y.o. YOB: 1980   Sex: female    Date:   8/11/2020    Session: 2 of  3   Surgeon: Dr. Ary Justice     Height: 5'8\" Weight:    255      Lbs. Starting Weight: 263  Lbs. BMI:  38      Do you smoke? no    Alcohol intake:  I drink very rarely. Class Guidelines    Guidelines are reviewed with patient at the start of every class. 1. Patient understands that weight loss trial classes must be consecutive. Patient understands if they miss a class, it is their responsibility to contact me to reschedule class. I will reach out to patient after their first no show. 2.  Patient understands the expectations that weight maintenance/weight loss is expected during the classes. Failure to demonstrate changes may result in one extra month of weight loss trial, followed by going back to see the surgeon. Patient understands that they CAN NOT gain any weight during the weight loss trial.  Gaining weight will result in extra classes. 3. Patient is also instructed to be doing their labs, blood work, psych visit, support group and any other test that the surgeon has used while they are working on their weight loss trial.  4.  Patient was instructed to bring their blue binder to every class and appointment. Changes Made Since Last Class:   Weaned herself off of diet pepsi. 64 oz of water. 75 grams of protein per day. Eating Habits and Behaviors    Today in class, we reviewed the key diet principles. I have talked to patient about pushing the fluid and working towards 64 ounces per day. We focused on following a low-calorie diet. Patient was instructed to count their carbohydrates and try to keep their daily intake under 75 grams per day and try to keep their daily protein at 80 grams per day. Patient was given examples of carbohydrates in starches.   Patient was encouraged to focus on meat and vegetables and begin cutting carbohydrates out. We talked about foods that are protein-based and how to incorporate those into their meals. I also reviewed with patient the importance of eating 3 meals per day and suggestions were made for breakfast items. Patient's current diet habits include:   3 meals consisting of a protein source, sometimes vegetables and sometimes a starch item. For snacks patient is choosing sunflower seeds, protein snacks. Physical Activity/Exercise    Comments: We talked about exercise. Patient was given reasons of why exercise is so important and how that can help with their long-term success. I have encouraged patient to get a support system to help with the activity. Currently for activity, patient is doing more walking. Behavior Modification       Comments:  During today's lesson, I gave a presentation called The 100-200 Calorie \"Mindless Margin. \"  The goal is to make modest daily 100-200 calorie reductions in certain things that the body won't notice. One, 100-200 calorie change and would will look 10-20 pounds in one year. An example could be cutting soda. Patient was given a check off list and was encouraged to come up with 1-3 100 calories changes they could make. The check off list is a daily tracker to see if these goals are being met. Goals that patient wants to work on includes:  1.  Tracking food and entering into  myfitnesspal.      Milton Deutsch RD

## 2020-08-20 ENCOUNTER — VIRTUAL VISIT (OUTPATIENT)
Dept: SURGERY | Age: 40
End: 2020-08-20

## 2020-08-20 VITALS — HEIGHT: 68 IN | WEIGHT: 258 LBS | BODY MASS INDEX: 39.1 KG/M2

## 2020-08-20 DIAGNOSIS — G47.33 OSA ON CPAP: ICD-10-CM

## 2020-08-20 DIAGNOSIS — E66.01 SEVERE OBESITY WITH BODY MASS INDEX (BMI) OF 35.0 TO 39.9 WITH SERIOUS COMORBIDITY (HCC): Primary | ICD-10-CM

## 2020-08-20 DIAGNOSIS — Z99.89 OSA ON CPAP: ICD-10-CM

## 2020-08-20 RX ORDER — AZATHIOPRINE 50 MG/1
100 TABLET ORAL DAILY
COMMUNITY
End: 2021-08-20

## 2020-08-20 NOTE — PROGRESS NOTES
Consent:  Layla Levy  and/or her healthcare decision maker is aware that this patient-initiated Telehealth encounter is a billable service, with coverage as determined by her insurance carrier. She is aware that she may receive a bill and has provided verbal consent to proceed: Yes    Services were provided through a video synchronous discussion virtually to substitute for in-person clinic visit. Pursuant to the emergency declaration under the [address] [place] [address], Psychiatric hospital waiver authority and the [employer] Act, this Virtual  Visit was conducted, with patient's consent, to reduce the patient's risk of exposure to COVID-19 and provide continuity of care for an established patient. Provider location while conducting visit: in the office   Patient location: Home  Other person participating in the telehealth services: Adrian Chahal   This virtual visit was conducted via interactive/real-time audio/video using D[employer]. Me  Visit start: 1330  Visit end: 1400            Bariatric Preoperative Progress Note      Subjective:     Layla Levy is a 38 y.o. female who presents today for followup of their candidacy for bariatric surgery. Since last seen, Layla Levy has been working through bariatric program towards LGBP with Dr. Aminata Timmons.      Past Medical History:   Diagnosis Date    Bladder wall thickening     Clonus     Ill-defined condition     spinal cord  compression    Incomplete bladder emptying     Myasthenia gravis (HCC)     Neurogenic bladder     Obesity     MARIAH on CPAP     Stress incontinence     Vitamin D deficiency        Past Surgical History:   Procedure Laterality Date    HX  SECTION      x3    HX ORTHOPAEDIC      HX OTHER SURGICAL  2017    C-4 meningioma resection    HX WISDOM TEETH EXTRACTION         Current Outpatient Medications   Medication Sig Dispense Refill    azaTHIOprine (Imuran) 50 mg tablet Take 100 mg by mouth daily. 2 daily      diazePAM (VALIUM) 5 mg tablet Take 1 Tab by mouth daily as needed (severe muscle spasm). 9 Tab 0    buPROPion XL (WELLBUTRIN XL) 300 mg XL tablet take 1 tablet by mouth once daily  0    pyridostigmine (MESTINON) 60 mg tablet START WITH take 1/2 tablet by mouth three times a day for 1 week . ..  (REFER TO PRESCRIPTION NOTES). 0    diclofenac (VOLTAREN) 1 % gel   0    baclofen (LIORESAL) 20 mg tablet   0    cetirizine (ZYRTEC) 10 mg tablet       EPINEPHrine (EPIPEN 2-YONI) 0.3 mg/0.3 mL injection EpiPen 2-Yoni 0.3 mg/0.3 mL injection, auto-injector      ergocalciferol (ERGOCALCIFEROL) 50,000 unit capsule       fluticasone (FLONASE) 50 mcg/actuation nasal spray       gabapentin (NEURONTIN) 600 mg tablet   0    tranexamic acid (LYSTEDA) 650 mg tab tablet Take 2 tablets 3 times a day by oral route for 5 days.       multivitamin with minerals (MULTIVITAMIN & MINERAL FORMULA PO) multivitamin      PAZEO 0.7 % drop       carboxymethylcellulose sodium (REFRESH TEARS) 0.5 % drop ophthalmic solution instill 1 drop into affected eye if needed      prazosin (MINIPRESS) 1 mg capsule   0    clobetasol (OLUX) 0.05 % topical foam clobetasol 0.05 % topical foam      meloxicam (MOBIC) 15 mg tablet   0       Allergies   Allergen Reactions    Other Food Shortness of Breath     Milk protein, casein, whey--throat swelling       Review of Systems:  Positive in BOLD  CONST: Fever, weight loss, fatigue or chills  GI: Nausea, vomiting, abdominal pain, change in bowel habits, hematochezia, melena, and GERD   INTEG: Dermatitis, abnormal moles  HEENT: Recent changes in vision, vertigo, epistaxis, dysphagia and hoarseness  CV: Chest pain, palpitations, HTN, edema and varicosities  RESP: Cough, shortness of breath, wheezing, hemoptysis, snoring and reactive airway disease  : Hematuria, dysuria, frequency, urgency, nocturia and stress urinary incontinence   MS: Weakness, joint pain hips and arthritis  ENDO: Diabetes, thyroid disease, polyuria, polydipsia, polyphagia, poor wound healing, heat intolerance, cold intolerance  LYMPH/HEME: Anemia, bruising and history of blood transfusions  NEURO: Dizziness, headache, fainting, seizures and stroke  PSYCH: Anxiety and depression      Objective:     Physical Exam:  Visit Vitals  Ht 5' 8\" (1.727 m)   Wt 117 kg (258 lb)   BMI 39.23 kg/m²       Physical Exam  Vitals signs reviewed. Constitutional:       Appearance: She is obese. HENT:      Head: Normocephalic and atraumatic. Pulmonary:      Effort: Pulmonary effort is normal.   Neurological:      Mental Status: She is alert and oriented to person, place, and time. Psychiatric:         Mood and Affect: Mood and affect normal.         Studies to date:    Labs: significant for Glucose 130, Triglycerides 180,     EKG: Normal sinus rhythm   Nonspecific T wave abnormality   Abnormal ECG   When compared with ECG of 26-DEC-2019 18:49,   No significant change was found   Confirmed by Joe Farmer ([id number]) on 7/8/2020 11:34:27 AM  Reviewed by Dr Ricardo Garibay 7/8/2020 12:41     Nutritional evaluation: 2/3, to finish next month, st wt: 263lbs     Psychiatric evaluation: approved, 7/24/2020 Dr Nichole Dobbs     Support Group: attended     Additional evaluations:  CXR: No active cardiopulmonary disease or change. PAP results: 4/19/2019 wnl, GYN annual 6/22/2020 with Dr. Valarie Clement wnl no pap required   Mammo results: IMPRESSION  No evidence for malignancy. BI-RADS CATEGORY: BI-RADS 1 : Negative   FOLLOWUP RECOMMENDATIONS: Routine annual follow-up    Assessment:   Maria Alejandra Knight is a 38 y.o. female who is progressing through the bariatric preoperative evaluation. At this time, they are an appropriate candidate for weight loss surgery. Plan:   -complete remainder of preop evaluation including remaining WLT. - patient communicates understanding that the expectation is to lose or maintain weight during WLT.   Weight gain may result in delay or cancellation of surgery.   -Follow up once has completed entirety of weight loss workup to determine next steps.       Iggy Downing, NAILAP-BC

## 2020-08-20 NOTE — Clinical Note
I need the notes from this pts last gyn visit  on 06/22/2020 with Vikash Phan M.D.: [address], [address], Rico Wyman, Ph. [phone]

## 2020-08-20 NOTE — PROGRESS NOTES
Clara Hansen is a 38 y.o. female  Chief Complaint   Patient presents with    Weight Management     midtrial       Past Medical History:   Diagnosis Date    Bladder wall thickening     Clonus     Ill-defined condition     spinal cord  compression    Incomplete bladder emptying     Myasthenia gravis (HCC)     Neurogenic bladder     Obesity     MARIAH on CPAP     Stress incontinence     Vitamin D deficiency        Past Surgical History:   Procedure Laterality Date    HX  SECTION      x3    HX ORTHOPAEDIC      HX OTHER SURGICAL  2017    C-4 meningioma resection    HX WISDOM TEETH EXTRACTION         Family History   Problem Relation Age of Onset    Hypertension Mother     Seizures Sister     Stroke Sister        Social History     Socioeconomic History    Marital status: UNKNOWN     Spouse name: Not on file    Number of children: Not on file    Years of education: Not on file    Highest education level: Not on file   Tobacco Use    Smoking status: Former Smoker    Smokeless tobacco: Former User   Substance and Sexual Activity    Alcohol use: Yes     Frequency: Never     Comment: occ    Drug use: No           Outpatient Medications Marked as Taking for the 20 encounter (Virtual Visit) with Natalia Solorio NP   Medication Sig Dispense Refill    azaTHIOprine (Imuran) 50 mg tablet Take 100 mg by mouth daily. 2 daily      diazePAM (VALIUM) 5 mg tablet Take 1 Tab by mouth daily as needed (severe muscle spasm). 9 Tab 0    buPROPion XL (WELLBUTRIN XL) 300 mg XL tablet take 1 tablet by mouth once daily  0    pyridostigmine (MESTINON) 60 mg tablet START WITH take 1/2 tablet by mouth three times a day for 1 week . ..  (REFER TO PRESCRIPTION NOTES).   0    diclofenac (VOLTAREN) 1 % gel   0    baclofen (LIORESAL) 20 mg tablet   0    cetirizine (ZYRTEC) 10 mg tablet       EPINEPHrine (EPIPEN 2-VAMSHI) 0.3 mg/0.3 mL injection EpiPen 2-Vamshi 0.3 mg/0.3 mL injection, auto-injector      ergocalciferol (ERGOCALCIFEROL) 50,000 unit capsule       fluticasone (FLONASE) 50 mcg/actuation nasal spray       gabapentin (NEURONTIN) 600 mg tablet   0    tranexamic acid (LYSTEDA) 650 mg tab tablet Take 2 tablets 3 times a day by oral route for 5 days.  multivitamin with minerals (MULTIVITAMIN & MINERAL FORMULA PO) multivitamin      PAZEO 0.7 % drop       carboxymethylcellulose sodium (REFRESH TEARS) 0.5 % drop ophthalmic solution instill 1 drop into affected eye if needed         Allergies   Allergen Reactions    Other Food Shortness of Breath     Milk protein, casein, whey--throat swelling       Pt ID confirmed    Weight Loss Metrics 8/20/2020 8/20/2020 7/1/2020 7/1/2020 12/26/2019 9/16/2019 9/27/2018   Pre op / Initial Wt 258 - 263 - - - -   Today's Wt - 258 lb - 263 lb 250 lb 250 lb 250 lb   BMI - 39.23 kg/m2 - 39.99 kg/m2 36.92 kg/m2 38.01 kg/m2 38.01 kg/m2   Ideal Body Wt 143 - 143 - - - -   Excess Body Wt 115 - 120 - - - -   Goal Wt 166 - 167 - - - -   Wt loss to date 0 - 0 - - - -   % Wt Loss 0 - 0 - - - -   80% EBW 92 - 96 - - - -       Body mass index is 39.23 kg/m². The diagnoses and plan were discussed with the patient. All questions answered. Plan of care agreed to by all concerned.

## 2020-09-14 ENCOUNTER — DOCUMENTATION ONLY (OUTPATIENT)
Dept: SURGERY | Age: 40
End: 2020-09-14

## 2020-09-14 NOTE — PROGRESS NOTES
[facility]  [address] [place] [address], [facility] [address] [facility], [address]  Patient's Name: Betsy Salcedo   Age: 38 y.o. YOB: 1980   Sex: female    Date:  09/14/2020    Session: 3 of  3  Surgeon:   Dr. Praveena Baxter    Height: 68\" Weight:    260.8 (verified home scale)      Lbs. Starting Weight: 263 #    BMI: 38    Do you smoke? No     Alcohol intake:    I drink very rarely. Class Guidelines    Guidelines are reviewed with patient at the start of every class. 1. Patient understands that weight loss trial classes must be consecutive. Patient understands if they miss a class, it is their responsibility to contact me to reschedule class. I will reach out to patient after their first no show. 2.  Patient understands the expectations that weight maintenance/weight loss is expected during the classes. Failure to demonstrate changes may result in one extra month of weight loss trial, followed by going back to see the surgeon. 3. Patient is also instructed to be doing their labs, blood work, psych visit, support group and any other test that the surgeon has used while they are working on their weight loss trial.    Changes Made Since Last Class:   Patient reports \"Ive consistently eaten at least 75g of protein and drank 64oz of water. Im kind of frustrated because Gwendolyn Phillips been working out every other day on the elliptical for 30-45 mins, too, (trying to have a healthy outlet for my dress), but with the extra exercise I starve. Then, I end up eating more. \"     Eating Habits and Behaviors      Today we reviewed key diet principles. We talked about ways that patient can follow a low calorie diet. These included: Stopping all liquid calories and patient was given a list of fluid choices that would be appropriate. We talked about carbohydrates.   Patient was educated that all carbohydrates turn to sugar and was encouraged to stick to the complex carbohydrates while in weight loss trials, but aim to keep less than 100 grams during weight loss trials. Patient was given a list of foods to not eat and drink due to high sugar, high fat, or high carbohydrate content. Patient was also given a list of foods and drinks that are high protein, low carbohydrate, and would be appropriate to eat. Patient was given a list of fruit and what a portion size is and how many carbohydrates it contains. Patient was encouraged to keep fruit intake to a minimum. Patient was also given a list of 50 low carbohydrate snack options, but was also cautioned that some of the choices still were high in calories and portion control should be practiced. Patient's current diet habits include:   Patient eats 3 meals a day consisting of a protein and/or a starch and sometimes a vegetable. Patient snack choices consist of cheese sticks, pepperoni slices, and nuts. Physical Activity/Exercise    Comments:     Currently for exercise, patient is taking at least 7500K steps a day and have started progressing to 10K. (Thats huge for me! ). .  We talked about activities for patient to do, including walking, swimming, or chair exercises. Behavior Modification       Comments:   Patient was encouraged to food journal. I talked with patient about tracking their daily carbohydrate. We also talked about the importance of planning ahead. Lack of willpower is often a lack of planning. We also covered the need to get sufficient sleep and ways to accomplish that. We also discussed how important it is to not snack mindlessly in the evening and to consider dinner the last meal of the day. Goals for next month:  plan to have all of my lunches premade daily. I am also going to have all the prep work for dinner time done in advance.  Griselda Gavin noticed I snack more while cooking and this will hopefully help with that)      This is the patient's last month for Weight Loss Trial. Since beginning the sessions she reports:  \"I have cut out carbonated beverages. Prior to this, I used to drink 10-12 Diet Pepsis a day. Now I drink water or unsweetened strawberry tea. I walk daily. I was shocked to learn that even drinking sugar free Diet Pepsi can raise blood sugars and cravings. Ive learned the importance of premeasuring and preplanning foods.  I did attend a support group\"         [facility], RD

## 2020-09-17 ENCOUNTER — TELEPHONE (OUTPATIENT)
Dept: SURGERY | Age: 40
End: 2020-09-17

## 2020-09-17 ENCOUNTER — DOCUMENTATION ONLY (OUTPATIENT)
Dept: SURGERY | Age: 40
End: 2020-09-17

## 2020-09-17 NOTE — PROGRESS NOTES
Nutrition Evaluation    Patient's Name: Zurdo Null   Age: 38 y.o. YOB: 1980   Sex: female    Height: 5'8\" Weight: 260.8 (verified home scale)  BMI:  38  Starting Weight:  38        Smoking Status:  no  Alcohol Intake:    I drink rarely. Changes made during classes include:  Decreased carbohydrate intake. Two things that patient learned during this weight loss trial:  The importance of portion control. The importance of fluid intake. Summary:  I feel that Zurdo Null has demonstrated appropriate diet changes and is ready to move forward with surgery. Patient has been briefed on the importance of the protein drinks, vitamins, and the transition of the diet stages. Patient understands that the long-term diet will focus on protein and vegetables. Patient understand the effects of carbohydrates after surgery and what reactive hypoglycemia is. Patient is aware that they will be attending pre-op class 2 weeks before surgery and will get more detailed information on the post-op diet guidelines. Patient will see me again at 6 weeks post-op. At this 6 week visit, RD will assess how patient is tolerating soft protein and advance to vegetables, if tolerating soft protein without difficulty. Patient will also see RD again at 9 months post-op. This visit will assess patient's compliance with current protocol, including diet, vitamins, protein shakes, and exercise. Post-op diet guidelines will be reinforced. RD is available for questions and to meet with patient outside of the 6 week and 9 month post-op visit. We spent a lot of time talking about the vitamins. Patient understands the importance of being compliant with the diet protocol and the complications and risks that can occur if they are non-compliant with the nutritional protocol. Patient has attended at least one support group.     Candidate for surgery:   Yes    Kathryn Wilkins RD  9/17/2020

## 2020-09-17 NOTE — TELEPHONE ENCOUNTER
Called patient to schedule pre op with Dr. Pete Vail at [facility] office. She stated she will call the office back next month. She is ready to come in for visit at this time,  and did not want to go forward with a date at this time.

## 2020-11-02 ENCOUNTER — DOCUMENTATION ONLY (OUTPATIENT)
Dept: SURGERY | Age: 40
End: 2020-11-02

## 2020-11-02 NOTE — PROGRESS NOTES
Spoke with pt in regards to scheduling a pre-op with Dr. Rosangela Duncan. Pt is not ready to move forward at this time. Pt did agree to come in and meet with NP to answer any questions or concerns she might have. Pt is scheduled to see Nydia eLe on 11/17 @ [facility].

## 2021-01-21 ENCOUNTER — TRANSCRIBE ORDER (OUTPATIENT)
Dept: REGISTRATION | Age: 41
End: 2021-01-21

## 2021-01-21 ENCOUNTER — HOSPITAL ENCOUNTER (OUTPATIENT)
Dept: GENERAL RADIOLOGY | Age: 41
Discharge: HOME | End: 2021-01-21
Payer: OTHER GOVERNMENT

## 2021-01-21 DIAGNOSIS — J98.8 CONGESTION OF UPPER AIRWAY: Primary | ICD-10-CM

## 2021-01-21 DIAGNOSIS — J98.8 CONGESTION OF UPPER AIRWAY: ICD-10-CM

## 2021-01-21 PROCEDURE — 71046 X-RAY EXAM CHEST 2 VIEWS: CPT

## 2021-02-12 ENCOUNTER — HOSPITAL ENCOUNTER (OUTPATIENT)
Dept: LAB | Age: 41
Discharge: HOME | End: 2021-02-12
Payer: OTHER GOVERNMENT

## 2021-02-12 LAB
BASOPHILS # BLD: 0 K/UL (ref 0–0.1)
BASOPHILS NFR BLD: 1 % (ref 0–2)
DIFFERENTIAL METHOD BLD: NORMAL
EOSINOPHIL # BLD: 0.1 K/UL (ref 0–0.4)
EOSINOPHIL NFR BLD: 2 % (ref 0–5)
ERYTHROCYTE [DISTWIDTH] IN BLOOD BY AUTOMATED COUNT: 14.4 % (ref 11.6–14.5)
HCT VFR BLD AUTO: 42.3 % (ref 35–45)
HGB BLD-MCNC: 13.2 G/DL (ref 12–16)
LYMPHOCYTES # BLD: 2.3 K/UL (ref 0.9–3.6)
LYMPHOCYTES NFR BLD: 32 % (ref 21–52)
MCH RBC QN AUTO: 28.3 PG (ref 24–34)
MCHC RBC AUTO-ENTMCNC: 31.2 G/DL (ref 31–37)
MCV RBC AUTO: 90.6 FL (ref 74–97)
MONOCYTES # BLD: 0.5 K/UL (ref 0.05–1.2)
MONOCYTES NFR BLD: 6 % (ref 3–10)
NEUTS SEG # BLD: 4.2 K/UL (ref 1.8–8)
NEUTS SEG NFR BLD: 59 % (ref 40–73)
PLATELET # BLD AUTO: 395 K/UL (ref 135–420)
PMV BLD AUTO: 9.6 FL (ref 9.2–11.8)
RBC # BLD AUTO: 4.67 M/UL (ref 4.2–5.3)
WBC # BLD AUTO: 7.2 K/UL (ref 4.6–13.2)

## 2021-02-12 PROCEDURE — 86003 ALLG SPEC IGE CRUDE XTRC EA: CPT

## 2021-02-12 PROCEDURE — 85025 COMPLETE CBC W/AUTO DIFF WBC: CPT

## 2021-02-12 PROCEDURE — 82785 ASSAY OF IGE: CPT

## 2021-02-12 PROCEDURE — 36415 COLL VENOUS BLD VENIPUNCTURE: CPT

## 2021-02-16 LAB
A ALTERNATA IGE QN: 5.19 KU/L
A FUMIGATUS IGE QN: <0.1 KU/L
AMER ROACH IGE QN: <0.1 KU/L
AMER SYCAMORE IGE QN: <0.1 KU/L
BAHIA GRASS IGE QN: <0.1 KU/L
BERMUDA GRASS IGE QN: <0.1 KU/L
BOXELDER IGE QN: <0.1 KU/L
C HERBARUM IGE QN: <0.1 KU/L
CAT DANDER IGG QN: <0.1 KU/L
CLASS DESCRIPTION, 600268: ABNORMAL
COMMON RAGWEED IGE QN: <0.1 KU/L
D FARINAE IGE QN: <0.1 KU/L
D PTERONYSS IGE QN: <0.1 KU/L
DEPRECATED IGE QN: <0.1 KU/L
DOG DANDER IGE QN: <0.1 KU/L
ENGL PLANTAIN IGE QN: <0.1 KU/L
IGE SERPL-ACNC: 19 IU/ML (ref 6–495)
JOHNSON GRASS IGE QN: <0.1 KU/L
M RACEMOSUS IGE QN: <0.1 KU/L
MT JUNIPER IGE QN: <0.1 KU/L
MUGWORT IGE QN: <0.1 KU/L
NETTLE IGE QN: <0.1 KU/L
P NOTATUM IGE QN: <0.1 KU/L
S BOTRYOSUM IGE QN: 0.58 KU/L
SHEEP SORREL IGE QN: <0.1 KU/L
SWEET GUM IGE QN: <0.1 KU/L
TIMOTHY IGE QN: <0.1 KU/L
WHITE BIRCH IGE QN: <0.1 KU/L
WHITE ELM IGG QN: <0.1 KU/L
WHITE HICKORY IGE QN: <0.1 KU/L
WHITE MULBERRY IGE QN: <0.1 KU/L
WHITE OAK IGE QN: <0.1 KU/L

## 2021-03-12 ENCOUNTER — TRANSCRIBE ORDER (OUTPATIENT)
Dept: REGISTRATION | Age: 41
End: 2021-03-12

## 2021-03-12 ENCOUNTER — HOSPITAL ENCOUNTER (OUTPATIENT)
Dept: LAB | Age: 41
Discharge: HOME | End: 2021-03-12
Payer: OTHER GOVERNMENT

## 2021-03-12 DIAGNOSIS — G70.00 MYASTHENIA GRAVIS WITHOUT EXACERBATION (HCC): ICD-10-CM

## 2021-03-12 DIAGNOSIS — G70.00 MYASTHENIA GRAVIS WITHOUT EXACERBATION (HCC): Primary | ICD-10-CM

## 2021-03-12 LAB
ALBUMIN SERPL-MCNC: 3.9 G/DL (ref 3.4–5)
ALBUMIN/GLOB SERPL: 1 {RATIO} (ref 0.8–1.7)
ALP SERPL-CCNC: 60 U/L (ref 45–117)
ALT SERPL-CCNC: 33 U/L (ref 13–56)
AST SERPL-CCNC: 20 U/L (ref 10–38)
BASOPHILS # BLD: 0 K/UL (ref 0–0.1)
BASOPHILS NFR BLD: 0 % (ref 0–2)
BILIRUB DIRECT SERPL-MCNC: 0.2 MG/DL (ref 0–0.2)
BILIRUB SERPL-MCNC: 0.9 MG/DL (ref 0.2–1)
DIFFERENTIAL METHOD BLD: NORMAL
EOSINOPHIL # BLD: 0.2 K/UL (ref 0–0.4)
EOSINOPHIL NFR BLD: 2 % (ref 0–5)
ERYTHROCYTE [DISTWIDTH] IN BLOOD BY AUTOMATED COUNT: 14.1 % (ref 11.6–14.5)
GLOBULIN SER CALC-MCNC: 3.9 G/DL (ref 2–4)
HCT VFR BLD AUTO: 41.2 % (ref 35–45)
HGB BLD-MCNC: 12.8 G/DL (ref 12–16)
LYMPHOCYTES # BLD: 2.2 K/UL (ref 0.9–3.6)
LYMPHOCYTES NFR BLD: 26 % (ref 21–52)
MCH RBC QN AUTO: 27.9 PG (ref 24–34)
MCHC RBC AUTO-ENTMCNC: 31.1 G/DL (ref 31–37)
MCV RBC AUTO: 90 FL (ref 74–97)
MONOCYTES # BLD: 0.5 K/UL (ref 0.05–1.2)
MONOCYTES NFR BLD: 7 % (ref 3–10)
NEUTS SEG # BLD: 5.5 K/UL (ref 1.8–8)
NEUTS SEG NFR BLD: 65 % (ref 40–73)
PLATELET # BLD AUTO: 408 K/UL (ref 135–420)
PMV BLD AUTO: 9.9 FL (ref 9.2–11.8)
PROT SERPL-MCNC: 7.8 G/DL (ref 6.4–8.2)
RBC # BLD AUTO: 4.58 M/UL (ref 4.2–5.3)
WBC # BLD AUTO: 8.4 K/UL (ref 4.6–13.2)

## 2021-03-12 PROCEDURE — 85025 COMPLETE CBC W/AUTO DIFF WBC: CPT

## 2021-03-12 PROCEDURE — 36415 COLL VENOUS BLD VENIPUNCTURE: CPT

## 2021-03-12 PROCEDURE — 80076 HEPATIC FUNCTION PANEL: CPT

## 2021-11-04 ENCOUNTER — TELEPHONE (OUTPATIENT)
Dept: ORTHOPEDIC SURGERY | Age: 41
End: 2021-11-04

## 2021-11-04 ENCOUNTER — OFFICE VISIT (OUTPATIENT)
Dept: ORTHOPEDIC SURGERY | Age: 41
End: 2021-11-04
Payer: OTHER GOVERNMENT

## 2021-11-04 DIAGNOSIS — M70.62 GREATER TROCHANTERIC BURSITIS OF BOTH HIPS: Primary | ICD-10-CM

## 2021-11-04 DIAGNOSIS — M53.3 COCCYX PAIN: ICD-10-CM

## 2021-11-04 DIAGNOSIS — M70.61 GREATER TROCHANTERIC BURSITIS OF BOTH HIPS: Primary | ICD-10-CM

## 2021-11-04 PROCEDURE — 20610 DRAIN/INJ JOINT/BURSA W/O US: CPT | Performed by: PHYSICAL MEDICINE & REHABILITATION

## 2021-11-04 PROCEDURE — 99204 OFFICE O/P NEW MOD 45 MIN: CPT | Performed by: PHYSICAL MEDICINE & REHABILITATION

## 2021-11-04 RX ORDER — TRIAMCINOLONE ACETONIDE 40 MG/ML
40 INJECTION, SUSPENSION INTRA-ARTICULAR; INTRAMUSCULAR ONCE
Status: COMPLETED | OUTPATIENT
Start: 2021-11-04 | End: 2021-11-04

## 2021-11-04 RX ORDER — LIDOCAINE HYDROCHLORIDE 10 MG/ML
6 INJECTION INFILTRATION; PERINEURAL ONCE
Status: COMPLETED | OUTPATIENT
Start: 2021-11-04 | End: 2021-11-04

## 2021-11-04 RX ADMIN — LIDOCAINE HYDROCHLORIDE 6 ML: 10 INJECTION INFILTRATION; PERINEURAL at 12:42

## 2021-11-04 RX ADMIN — TRIAMCINOLONE ACETONIDE 40 MG: 40 INJECTION, SUSPENSION INTRA-ARTICULAR; INTRAMUSCULAR at 12:43

## 2021-11-04 NOTE — PROGRESS NOTES
[address]  [address], [address]  [place], [address] [place] [address] Street  Phone: [phone]  Fax: [phone]      Patient: Princess Michelle                                                                              MRN: [id number]        YOB: 1980          AGE: 42 y.o. PCP: Ej Norris MD  Date:  11/04/21    Reason for Consultation: Hip Pain and Back Pain      HPI:  Princess Michelle is a 42 y.o. female with relevant PMH of a large cervical meningioma C4-8 causing severe compression dx in 2017 s/p resection and laminoplasty 8/31/2017-Dr. Desmond Jo. She did inpatient rehabilitation at [facility] and gradually regained most of her strength and her ability to walk. She does have some mild residual right lower extremity and left upper extremity as well as spasticity. She has reduced sensation in her right leg. She went to pelvic floor PT which helped with voiding. She developed myasthenia gravis after her spine surgery. Overall she has been doing well but she has been trying to walk more and lose weight but has noticed increased pain in b/l lateral hips and in her coccyx. While doing pelvic floor PT years ago she was told her coccyx felt like it was not aligned. Neurologic symptoms: + scattered numbness, tingling, weakness, SCI - did pelvic floor PT bladder changes. No bowel issues but consitpation. No recent falls      Location: The pain is located in the coccyx, into the hips  Radiation: The pain does radiate into b/l lateral hips . Pain Score: Currently: 5/10   Quality: Pain is of a Achy, Cramping, Stiff, Tight and Pulling quality. Aggravating: Pain is exacerbated by walking, sitting and standing  Alleviating:  The pain is alleviated by lying down    Prior Treatments:   Pelvic floor PT in 2018- [facility]   Previous Medications:  Baclofen stopped due to asthma, flexeril- tired  Current Medications: valium 5mg bid, gabapentin 600mg TID  Previous work-up has included:   MRI cervical spine 2018  No evidence of recurrent or residual meningioma   2. Operative region cord deformity, gliosis   -Interval diminished or resolved cord edema   -Small retrospinous fluid collection, innocuous appearing, interval smaller   3. Mild degenerative central spinal stenosis   4. Mild diffuse cervical lymphadenopathy: abnormally increased number of normal-sized lymph nodes diffusely at anterior and posterior chains. Stable back to . Nonspecific and very probably benign     MRI lumbar spine 2017  Normal anatomic alignment of the lumbar spine.  Vertebral body hemangioma noted at L2 posteriorly, 1.7 cm.  Additional tiny vertebral body hemangioma at L5 vertebral body comment 6 mm.  No infiltrative marrow replacement process or marrow edema.  Incidentally imaged retroperitoneum and paraspinous soft tissues demonstrate no acute abnormalities. Mild degenerative facet hypertrophy at L3/L4, L4/L5 and L5/S1 levels, and mild bulging of the disc at L3/L4 and L4/L5 noted, without central canal or foraminal compromise. Past Medical History:   Past Medical History:   Diagnosis Date    Asthma     Bladder wall thickening     Clonus     Ill-defined condition     spinal cord  compression    Incomplete bladder emptying     Myasthenia gravis (HCC)     Neurogenic bladder     Obesity     MARIAH on CPAP     uses cpap    Stress incontinence     Vitamin D deficiency       Past Surgical History:   Past Surgical History:   Procedure Laterality Date    HX  SECTION      x3    HX COLONOSCOPY      HX ORTHOPAEDIC      HX OTHER SURGICAL  2017    C-4 meningioma resection    HX WISDOM TEETH EXTRACTION        SocHx:   Social History     Tobacco Use    Smoking status: Former Smoker    Smokeless tobacco: Former User   Substance Use Topics    Alcohol use: Yes     Comment: occ      FamHx:?    Family History   Problem Relation Age of Onset    Hypertension Mother     Seizures Sister     Stroke Sister        Current Medications:    Current Outpatient Medications   Medication Sig Dispense Refill    albuterol (PROVENTIL HFA, VENTOLIN HFA, PROAIR HFA) 90 mcg/actuation inhaler Take  by inhalation every six (6) hours as needed for Wheezing.  diazePAM (VALIUM) 5 mg tablet Take 1 Tab by mouth daily as needed (severe muscle spasm). 9 Tab 0    buPROPion XL (WELLBUTRIN XL) 300 mg XL tablet take 1 tablet by mouth once daily  0    pyridostigmine (MESTINON) 60 mg tablet START WITH take 1/2 tablet by mouth three times a day for 1 week . ..  (REFER TO PRESCRIPTION NOTES). 0    diclofenac (VOLTAREN) 1 % gel every six (6) hours. 0    cyclobenzaprine (FLEXERIL) 10 mg tablet cyclobenzaprine 10 mg tablet (Patient not taking: Reported on 11/4/2021)      mycophenolate (CELLCEPT) 500 mg tablet Take 500 mg by mouth two (2) times a day.  fluticasone propion-salmeteroL (Advair Diskus) 100-50 mcg/dose diskus inhaler Take 1 Puff by inhalation every twelve (12) hours.  cetirizine (ZYRTEC) 10 mg tablet daily as needed.  EPINEPHrine (EPIPEN 2-YONI) 0.3 mg/0.3 mL injection EpiPen 2-Yoni 0.3 mg/0.3 mL injection, auto-injector (Patient not taking: Reported on 8/23/2021)      ergocalciferol (ERGOCALCIFEROL) 50,000 unit capsule every seven (7) days.  fluticasone (FLONASE) 50 mcg/actuation nasal spray       gabapentin (NEURONTIN) 600 mg tablet three (3) times daily. 0    tranexamic acid (LYSTEDA) 650 mg tab tablet WHEN ON MENSTRUAL CYCLE      multivitamin with minerals (MULTIVITAMIN & MINERAL FORMULA PO) multivitamin      PAZEO 0.7 % drop as needed.  carboxymethylcellulose sodium (REFRESH TEARS) 0.5 % drop ophthalmic solution instill 1 drop into affected eye if needed        Allergies:     Allergies   Allergen Reactions    Other Food Shortness of Breath     Milk protein, casein, whey--throat swelling        Review of Systems:   Gen:    Denied fevers, chills, malaise, fatigue, weight changes   Resp: Denied shortness of breath, cough, wheezing   CVS: Denied chest pain, palpitations   : Denied urinary urgency, frequency, incontinence   GI: Denied nausea, vomiting, constipation, diarrhea   Skin: Denied rashes, wounds   Psych: Denied anxiety, depression   Vasc: Denied claudication, ulcers   Hem: Denied easy bruising/bleeding   MSK: See HPI   Neuro: See HPI         Physical Exam     Vital Signs: There were no vitals taken for this visit. General: ??????? Well nourished and well developed female without any acute distress   Psychiatric: ?  Alert and oriented x 3 with normal mood    HEENT: ???????? Atraumatic   Respiratory:   Breathing non-labored and non dyspneic   CV: ???????????????? Peripheral pulses intact, no peripheral edema   Skin: ????????????? No rashes       Neurologic: ??       Sensation: normal and grossly intact thebilateral, upper extremity(s), lower extremity(s) except diminished right lower extremity    Strength: 5/5 in the bilateral, upper extremity(s), lower extremity(s) except left wrist flexion 4/5, finger abduction 4/5 , right DF 4/.5  Reflexes: reveals 2+ symmetric DTRs throughout   Gait: normal and difficulty with tandem gait    Musculoskeletal: Hip Exam      Alignment: Normal   Atrophy: None   Single leg stance: Normal    Tenderness to Palpation:   Anterior hip: Negative  Adductors: Negative  Greater trochanter: Positive b/l  IT Band: Negative  Gluteal:Positive b/l  SI Joint:  Negative  Lumbar paraspinals or spinous processes: Negative   + tenderness at the coccyx    ROM:   Hip Instability: None   Hip ROM: Normal tight  Lumbar ROM: No reproduction of pain with movement     Special Tests    [facility]: Negative  Log Roll: Negative  Scour:  Negative  KUSHAL: Negative  FADIR: Negative  Shelby's:Negative  SI joint compression: Negative  Gaenslens: Negative  Slump test, femoral stretch, SLR: Negative  Tight hamstrings hip flexors, quads      Medical Decision Making:    Images: The imaging results    Reviewed prior MRI cervical spine 2018, lumbar spine 2017  Reviewed prior NSGY notes      Assessment:     -bilateral greater trochanteric bursitis with gluteal tendinopathy  - coccyx pain  - prior cervical spinal cord injury secondary to meningioma    Plan:      -Physical therapy -  Referral to PT to work on gluteal strengthening, pelvic stabilization  -Medications - continue current medications. Counseled regarding side effects and appropriate administration of medications.    -Diagnostics/Imaging - x-ray sacrum coccyx  -Injections - bilateral greater trochanteric bursa injections today    I have discussed the risk and benefits of this surgery/procedure and the alternatives with the patient. All questions answered to their satisfaction. This procedure has been fully reviewed with the patient and written informed consent has been obtained. A steroid injection was performed at 3ml using 1% plain Lidocaine and 20mg of Kenalog into the right and the left GT bursa This was well tolerated.    -Lifestyle -Encouraged continued weight loss    -Education - The patient's diagnosis, prognosis and treatment options were discussed today. All questions were answered. F/U - in 8 week(s) or sooner if needed.   Consider ultrasound guided GT bursa injection, further evaluation of b/l hips and coccyx         [facility]

## 2021-11-04 NOTE — LETTER
11/4/2021    Patient: Orin Kilpatrick   YOB: 1980   Date of Visit: 11/4/2021     Gracy Collet, MD  [facility] [address] [id number]  Via Fax: [phone]    Dear Gracy Collet, MD,      Thank you for referring Ms. Orin Kilpatrick to [facility] for evaluation. My notes for this consultation are attached. If you have questions, please do not hesitate to call me. I look forward to following your patient along with you.       Sincerely,    Danial Grayson MD

## 2021-11-04 NOTE — LETTER
NAME: Rufino Mejia  : 1980  MRN: [id number]    PROCEDURAL INFORMED CONSENT FOR OPERATION / PROCEDURE     1. I (we),      Rufino Mejia      authorize    Brenden Bridges MD       and/or such assistants as may be selected by him/her, to perform the following operation/procedures    Bilateral greater trochanteric bursa injections     Note: If unable to obtain consent prior to an emergent procedure, document the emergent reason in the medical record. This procedure has been explained to my (our) satisfaction and included in the explanation was:   A) the intended benefit, nature, and extent of the procedure to be performed;  B) the significant risks involved and the probability of success;  C) alternative procedures and methods of treatment;  D) the dangers and probable consequences of such alternatives (including no procedure or treatment); E) the expected consequences of the procedure on my future health;  F) whether other qualified individuals would be performing important surgical tasks and / or whether  would be present to advise or support the procedure. I (we) understand that there are other risks of infection and other serious complications in the pre-operative/procedural and postoperative/procedural stages of my (our) care. I (we) have asked all of the questions which I (we) thought were important in deciding whether or not to undergo treatment or diagnosis. These questions have been answered to my (our) satisfaction. I (we) understand that no assurance can be given that the procedure will be a success, and no guarantee or warranty of success has been given to me (us). 2.  It has been explained to me (us) that during the course of the operation/procedure, unforeseen conditions may be revealed that necessitate extension of the original procedure(s) or different procedure(s) than those set forth in Paragraph 1.  I (we) authorize and request that the above-named physician, his/her assistants or his/her designees, perform procedures as necessary and desirable if deemed to be in my (our) best interest.    3.  I acknowledge that other health care personnel may be observing this procedure for the purpose of medical education or other specified purposes as may be necessary as requested and/or approved by my (our) physician. 4.  I (we) consent to the disposal by the hospital Pathologist of the removed tissue, parts or organs in accordance with hospital policy. Page 1 of 2    NAME: Hector Valle  : 1980  MRN: [id number]    8. I do_____ do not______ consent to the use of a local infiltration pain blocking agent that will be used by my provider/surgical provider to help alleviate pain during my procedure. 6.  I do_____ do not_____ consent to an emergent blood transfusion in the case of a life-threatening situation that requires blood components to be administered. This consent is valid for 24 hours from the beginning of the procedure. 7.  This patient does _____ or does not ______currently have a DNR status/order. If DNR order is in place, obtain Addendum to the Surgical Consent for ALL Patients with a DNR Order to address cynthia-operative status for limited intervention or DNR suspension. 8.  I have read and fully understand the above Consent for Operation/Procedure and that all blanks were completed before I signed the consent.       Hector Valle     Signature of Patient (or legal representative)  Printed Name / Pamela Bailey                2021 /         AM / PM   Witness to Signature  Printed Name   Date/Time        (If patient is unable to sign or is a minor, complete the following)               Patient is a minor, ____years of age, or unable to sign because: _____________________          ________________________________________________________________________  [facility] If a phone consent is obtained, consent will be documented by using two health care professionals, each affirming that the consenting party   has no questions and gives consent for the procedure discussed with the physician/provider. 11/4/2021 /               AM / PM   2nd Witness to phone consent  Printed Name   Date/Time     Informed Consent:  I have provided the explanation described above in section 1 to the patient and/or legal representative. I have provided the patient and/or legal representative with an opportunity to ask any questions about the proposed operation/procedure. Annia Asencio MD    11/4/2021   /            AM / PM   Provider / Proceduralist  Printed Name  Date/Time     This Provider / Dominguez Anchors performing the surgery is ONLY for Office-based procedures in [place]   [ x] Board certified or Board eligible by one of the [employer] [place], the [employer] [facility] of the [employer], the [employer] of Podiatric Medicine, the [employer] of Foot and Ankle Surgery, or other board as approved by the [facility] for medical staff appointment.             Page 2 of 2  Revised 8/2/2021                Sincerely,      Annia Asencio MD

## 2021-11-16 ENCOUNTER — HOSPITAL ENCOUNTER (OUTPATIENT)
Dept: PHYSICAL THERAPY | Age: 41
Discharge: HOME | End: 2021-11-16
Payer: OTHER GOVERNMENT

## 2021-11-16 PROCEDURE — 97161 PT EVAL LOW COMPLEX 20 MIN: CPT

## 2021-11-16 PROCEDURE — 97110 THERAPEUTIC EXERCISES: CPT

## 2021-11-16 NOTE — PROGRESS NOTES
PHYSICAL THERAPY - DAILY TREATMENT NOTE  Patient Name: Daphne Acosta        Date: 2021  : 1980   [x]  Patient  Verified  Visit #:   1     Insurance: Payor: ARIE / Plan: Kolby Arndt 73 / Product Type:  /      In time:   10:05          Out time:   10:55 Total Treatment Time (min):   50     TTREATMENT AREA = Left hip pain [M25.552]  Right hip pain [M25.551]    SUBJECTIVE    Pain Level (on 0 to 10 scale):  4  / 10   Medication Changes/New allergies or changes in medical history, any new surgeries or procedures? 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 Spent a lot of time in the hospital. [place] to walk again and use use her left arm in rehab. Currently gets foot drop to right LE when tired. Has unsteady gait. Reports has been trying to lose weight and increased her walking this past summer. Hips started hurting,  helped to massage but he's going on deployment. Recently had injections which improved but not all the way gone.     or []  No apparent reason  Or  Surgery- DOS=     Symptoms at onset: lateral aspect of B hips    Constant symptoms:ache, sore     Intermittent symptoms:sharp pain    What produces or worsens: standing >30', stairs, floor transfers, walking     What stops or reduces:rest, ice, voltarin    Continued use makes the pain:  [] Better []  Worse []  No effect     Disturbed night: [] No    [x] Yes - loses ~3 hours   Pain at rest: [] No    [x] Yes       Treatments this episode: stretches she's learned     Previous treatment:no formal rx to hips      Spinal history:see above    Paraesthesia: [] No    [x] Yes     General Health:  [] Good [x]  Fair []  Poor     Imaging:   [x] Yes []  No     Summary:    [] Acute []  Sub-acute [x]  Chronic     [] Trauma []  Insidious onset       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        OBJECTIVE    Gait:  [] Normal    [x] Abnormal    [] Antalgic    [] NWB    Device:      Neuro Screening  Myotome Level Muscles Nerve Reflex Sensation Action   L1-L3 Iliopsoas T12/L1-3  Quadriceps L2-4  Adductors L2-L4 (Iliacus)- Femoral  Femoral  Obturator/Sciatic N/A  L3-4 = Patella L1- Inguinal Crease  L2- Anterior Thigh  L3- Anterior Thigh above knee Hip Flexion  Knee Extension   L4 Tibialis anterior L4&5   Deep Peroneal Patella Anterior Knee Suprapatellar Ankle Dorsiflexion   L5 Extensor Hallucis Longus  Extensor Digitorum Lungus  Gluteus Medius Deep Peroneal         Superior Gluteal None Reliable Dorsum of Foot/Great Toe  Anterior Shank Extensor  to Great Toe        Hip Internal Rotation   S1 Peroneus Longus  Gastrocnemius & Soleus  Gluteus Severino Superficial Peroneal  Tibial    Inferior Gluteal Achilles Tendon Lateral Shank around Lateral Malleolus  Lateral Aspect/Dorsum of GT   Plantar Flexion      Hip Extension   Notable findings from above: Sensory intact         ROM/Strength        AROM                     PROM        Strength (1-5)  Hip Left Right Left Right Left Right   Flexion 120 120   4- 4-   Extension 11 14   4- 4-   Abduction 50 50   4- 4-   ER Supine 80 75   4- 4-   IR Supine 20 20   4- 4-   ER Prone 45 37   4- 4-   IR Prone 20 20   4- 4-   Knee Left Right Left Right Left Right   Extension         Flexion              Flexibility: [] Unable to assess at this time  Hamstrings:    (L) Tightness= [] WNL   [] Min   [x] Mod   [] Severe    (R) Tightness= [] WNL   [] Min   [x] Mod   [] Severe  Quadriceps:    (L) Tightness= [] WNL   [] Min   [x] Mod   [] Severe    (R) Tightness= [] WNL   [] Min   [x] Mod   [] Severe  Gastroc:    (L) Tightness= [] WNL   [] Min   [x] Mod   [] Severe    (R) Tightness= [] WNL   [] Min   [x] Mod   [] Severe                                  Palpation  [] Min  [x] Mod  [] Severe    Location:left lateral hip/glutes  [] Min  [x] Mod  [] Severe    Location:right lateral hip/glutes  [] Min  [] Mod  [] Severe    Location:    Optional Tests  Jose/ Forrest Test: [] Neg    [] Pos  Ben Test:  [] Neg    [x] Pos  Scouring Test:  [] Neg    [] Pos  Trendelenberg:  [] Neg    [] Pos [] Left    [] Right  OberTest:   [] Neg    [] Pos  Ely's Test:  [] Neg    [x] Pos  Piriformis Test:  [] Neg    [] Pos  Functional Leg Length (cm):   Right:  Left:  Discrepancy:    Other tests/ comments:    Therapeutic Procedures:  Min Procedure Specifics + Rationale   n/a [x]  Patient Education (performed throughout session) [x] Review HEP    [] Progressed/Changed HEP based on:   [] proper performance and advancement of Therex/TA   [] reduction in pain level    [] increased functional capacity       [] change in directional preference   23 [x] Therapeutic Exercise   [x]  See Flowsheet   Rationale: increase ROM and increase strength to improve the patients ability to participate in ADL's          Post Treatment Pain Level (on 0 to 10) scale:   3-4  / 10     ASSESSMENT    Assessment/Changes in Function:     Justification for Eval Code Complexity:  Patient History (low 0, mod 1-2, high 3-4): mildred  Examination (low 1-2, mod 3+, high 4+): high  Clinical Presentation (low stable or uncomplicated, mod evolving or changing, high unstable or unpredictable): low  Clinical Decision Making (low , mod 26-74, high 1-25): FOTO mod      []  See Progress Note/Recertification   Patient will continue to benefit from skilled PT services to modify and progress therapeutic interventions, address functional mobility deficits, address ROM deficits, address strength deficits, analyze and address soft tissue restrictions, analyze and cue movement patterns, analyze and modify body mechanics/ergonomics, assess and modify postural abnormalities, address imbalance/dizziness and instruct in home and community integration  to attain remaining goals   Progress toward goals / Updated goals:    See POC     PLAN    [x]  Upgrade activities as tolerated YES Continue plan of care   []  Discharge due to :    []  Other:      Therapist: Farrukh Khoury \"BJ\" MARK Cao, Cert. MDT, Cert. DN, Cert. SMT, Dip.  Osteopractic    Date: 11/16/2021 Time: 10:13 AM     Future Appointments   Date Time Provider Rico Pena   11/16/2021 10:15 AM Romain Romero, PT [facility]   12/30/2021  8:45 AM Demarcus Ivy MD [facility]

## 2021-11-16 NOTE — PROGRESS NOTES
[address] [place] [address] PHYSICAL THERAPY  [address] [place] [address] Graciela Wyman, [address] - Phone: [phone]  Fax: [phone] / 8[facility]  Patient Name: Daphne Acosta : 1980   Medical   Diagnosis: Left hip pain [M25.552]  Right hip pain [M25.551] Treatment Diagnosis: B Hip Bursitis/Gluteal Tendinopathy   Onset Date: Summer 2021m     Referral Source: [facility]): 2021   Prior Hospitalization: See medical history Provider #: [id number]   Prior Level of Function: Functionally independent   Comorbidities: Hx C3-C8 sx, myasthenia gravis, asthma, LBP   Medications: Verified on Patient Summary List   The Plan of Care and following information is based on the information from the initial evaluation.   ===========================================================================================  Assessment / key information: Patient is a 42 y.o. female presenting with CC B hip pain since summer 2021. She attributes symptoms to her attempts to lose weight via walking for extended periods of time. Patient has since reduced her walking but has had continued B hip pain despite recent injections. She currently demonstrates the following objective measures:                    AROM                          Strength (1-5)  Hip Left Right Left Right   Flexion 120 120 4- 4-   Extension 11 14 4- 4-   Abduction 50 50 4- 4-   ER Supine 80 75 4- 4-   IR Supine 20 20 4- 4-   ER Prone 45 37 4- 4-   IR Prone 20 20 4- 4-   Palpation presents with generalized soreness and TTrPs to lateral line. Hip pain triggers LBP when ambulating/standing for prolonged distances/time. Pt  will benefit from physical therapist management to address her impairments (listed below),  educate her, and improve her level of function.  Thanks for your referral. ===========================================================================================  Eval Complexity: History: HIGH Complexity :3+ comorbidities / personal factors will impact the outcome/ POC Exam:HIGH Complexity : 4+ Standardized tests and measures addressing body structure, function, activity limitation and / or participation in recreation  Presentation: LOW Complexity : Stable, uncomplicated  Clinical Decision Making:MEDIUM Complexity : FOTO score of 26-74Overall Complexity:LOW   Problem List: pain affecting function, decrease ROM, decrease strength, impaired gait/ balance, decrease ADL/ functional abilitiies, decrease activity tolerance, decrease flexibility/ joint mobility and decrease transfer abilities  FOTO score: 35 indicating 65% functional disability  Treatment Plan may include any combination of the following: Therapeutic exercise, Therapeutic activities, Neuromuscular re-education, Physical agent/modality, Gait/balance training, Manual therapy, Patient education, Self Care training, Functional mobility training, Home safety training and Stair training  Patient / Family readiness to learn indicated by: asking questions, trying to perform skills and interest  Persons(s) to be included in education: patient (P)  Barriers to Learning/Limitations: yes;  physical  Measures taken: Treatment modified as necessary   Patient Goal (s): \"Be able to move more and better with less pain\"   Patient self reported health status: fair  Rehabilitation Potential: good   Short Term Goals: To be accomplished in  4  weeks:  1. Patient to be adherent to HEP to facilitate pain control with ADL's.  2. Patient to report > 50% improvement in sleep interrupted by B Hip pain. 3. Patient to demonstrate ability to perform > 10 bridges to neutral to indicate increased posterior chain activation for performance in transfers/bed mobility.  Long Term Goals: To be accomplished in  8  weeks:  1.  Patient to be Safe and Independent with HEP to self-manage/prevent symptoms after DC. 2. Patient to increase FS FOTO score to > 60 to indicate increased functional independence. 3. Patient to demonstrate no difficulty negotiating stairs in reciprocal pattern. 4. Patient to report resumption of walking program with > 75% improvement in pain control. Frequency / Duration:   Patient to be seen  1-2  times per week for 8  weeks:  Patient / Caregiver education and instruction: activity modification and exercises. We reviewed our facility's Patient Personal Responsibilities (PPR) form, particularly in regards to compliance towards her appointment time, our attendance policy, and her home exercise program. Patient was informed of possible discharge for non-compliance to our attendance policy per PPR form. We also discussed her POC as deemed appropriate by the treating therapist and physician. Patient verbalized understanding that she must show objective and functional improvement in an appropriate time frame. Patient verbalized understanding that should progress or compliance be lacking, we will contact the referring physician for further consultation to address and attempt to establish alternate treatment strategies as necessary and/or possibly discharge. Therapist Signature: Selene Goyal \"BJ\" Juliana Ayala, MARK, Cert. MDT, Cert. DN, Cert. SMT, Dip. Osteopractic Date: 20/80/3233   Certification Period: n/a Time: 11:03 AM   ===========================================================================================  I certify that the above Physical Therapy Services are being furnished while the patient is under my care. I agree with the treatment plan and certify that this therapy is necessary. Physician Signature:        Date:       Time:                                         Heidi Jimenez*   Please sign and return to [employer] or you may fax the signed copy to [phone]. Thank you.

## 2021-11-19 ENCOUNTER — HOSPITAL ENCOUNTER (OUTPATIENT)
Dept: PHYSICAL THERAPY | Age: 41
Discharge: HOME | End: 2021-11-19
Payer: OTHER GOVERNMENT

## 2021-11-19 PROCEDURE — 97110 THERAPEUTIC EXERCISES: CPT

## 2021-11-19 NOTE — PROGRESS NOTES
PHYSICAL THERAPY - DAILY TREATMENT NOTE    Patient Name: Otoniel Hand        Date: 2021  : 1980   YES Patient  Verified  Visit #:   2     Insurance: Payor: ARIE / Plan: Kolby Arndt 74 / Product Type:  /    In time: 8454 Out time: 99   Total Treatment Time: 45     Medicare/BCBS Time Tracking (below)   Total Timed Codes (min):  NA 1:1 Treatment Time:  NA     TREATMENT AREA =  Left hip pain [M25.552]  Right hip pain [M25.551]    SUBJECTIVE    Pain Level (on 0 to 10 scale):  2  / 10   Medication Changes/New allergies or changes in medical history, any new surgeries or procedures? NO    If yes, update Summary List   Subjective Functional Status/Changes:  []  No changes reported     Decreased pain today that she attributes to cortisone shot. Reports compliance with HEP. Compliance with HEP  Yes [] No []         OBJECTIVE  Therapeutic Procedures:  Min Procedure Specifics + Rationale   45(45) [x] Therapeutic Exercise    See Flowsheet   Rationale: increase ROM and increase strength to improve the patients ability to participate in ADL's         min Patient Education:  YES Reviewed HEP         Other Objective/Functional Measures:    See flowsheet for more details    Initiated POC    Mod/min VC and demos required throughout session for proper form and increased safety         Post Treatment Pain Level (on 0 to 10) scale:   4 muscular fatigue  / 10     ASSESSMENT    Assessment/Changes in Function:     Hope tolerated session well with rest breaks as needed. Some signs of instability with standing movements. Decreased stability with standing hip abductions.       []  See Progress Note/Recertification   Patient will continue to benefit from skilled PT services to analyze,, cue,, progress,, modify,, demonstrate,, instruct, and address, movement patterns,, therapeutic interventions,, postural abnormalities,, soft tissue restrictions,, ROM,, strength,, functional mobility,, body mechanics/ergonomics, and home and community integration, to attain remaining goals.    Progress toward goals / Updated goals:    1st session since initial evaluation, no notable progress yet     PLAN    [x]  Upgrade activities as tolerated YES Continue plan of care   []  Discharge due to :    []  Other:      Therapist: Catherine Maddox DPT    Date: 11/19/2021 Time: 11:50 AM     Future Appointments   Date Time Provider Rico Pena   11/23/2021 10:15 AM Ratna Fowler PTA [facility]   11/26/2021 11:45 AM Kinza Rogers [facility]   11/29/2021 10:15 AM Orien Aase, PT [facility]   12/3/2021 12:30 PM Kinza Rogers [facility]   12/7/2021  9:30 AM Orien Aase, PT [facility]   12/9/2021  9:30 AM Orien Aase, PT [facility]   12/13/2021 10:15 AM Ratna Fowler PTA [facility]   12/15/2021 10:15 AM Ratna Fowler PTA [facility]   12/20/2021  8:45 AM Ratna Fowler PTA [facility]   12/22/2021  9:30 AM Orien Aase, PT [facility]   12/27/2021  8:45 AM Ratna Fowler PTA [facility]   12/29/2021  8:45 AM Cici Hough PTA [facility]   12/30/2021  8:45 AM Mary Jimenez MD [facility]

## 2021-11-23 ENCOUNTER — HOSPITAL ENCOUNTER (OUTPATIENT)
Dept: PHYSICAL THERAPY | Age: 41
Discharge: HOME | End: 2021-11-23
Payer: OTHER GOVERNMENT

## 2021-11-23 PROCEDURE — 97110 THERAPEUTIC EXERCISES: CPT

## 2021-11-23 PROCEDURE — 97530 THERAPEUTIC ACTIVITIES: CPT

## 2021-11-23 NOTE — PROGRESS NOTES
PHYSICAL THERAPY - DAILY TREATMENT NOTE    Patient Name: Linwood oLbo        Date: 2021  : 1980   yes Patient  Verified  Visit #:   3     Insurance: Payor:  / Plan: Kolby Arndt 74 / Product Type:  /      In time:  Out time:    Total Treatment Time: 52     TREATMENT AREA =  Left hip pain [M25.552]  Right hip pain [M25.551]    SUBJECTIVE  Pain Level (on 0 to 10 scale):  2  / 10   Medication Changes/New allergies or changes in medical history, any new surgeries or procedures?    no  If yes, update Summary List   Subjective Functional Status/Changes:  []  No changes reported     \"I was able to walk a lot yesterday at [address].  I was there all day, I am feeling it today:          OBJECTIVE    42 min Therapeutic Exercise:  [x]  See flow sheet   Rationale:      increase ROM, increase strength and improve coordination to improve the patients ability to safely perform ADLs, bending/stooping/ lifting; perform prolong sitting/standing/ambulation; and negotiate stairs with no pain or limitations        10 min Therapeutic Activity: [x]  See flow sheet   Rationale:    increase ROM, increase strength and improve coordination to improve the patients ability to safely perform ADLs, bending/stooping/ lifting; perform prolong sitting/standing/ambulation; and negotiate stairs with no pain or limitations       Billed With/As:   [x] TE   [x] TA   [] Neuro   [] Self Care Patient Education: [x] Review HEP    [] Progressed/Changed HEP based on:   [x] positioning   [x] body mechanics   [x] transfers   [] heat/ice application    [x] other: Pt ed on importance and benefits of compliance with HEP, core strength/stability and proper posture; pt verbalized understanding       Other Objective/Functional Measures:  VCs + demo to perform proper technique for TE  Initiated TE per flowsheet  SBA for HK amb x 60', decrease foot clearance/DF, VCs to increase JUDITH  Note pt demos increase LE spasticity   increase time to allow pt to follow instructions due MG     Post Treatment Pain Level (on 0 to 10) scale:   2  / 10     ASSESSMENT  Assessment/Changes in Function:   limited by scap \"cramps\" in sitting and QL \"cramps\" with SLing clams/hip abd  demos fair - bridge form, without increase pain   []  See Progress Note/Recertification   Patient will continue to benefit from skilled PT services to modify and progress therapeutic interventions, address functional mobility deficits, address ROM deficits, address strength deficits, analyze and address soft tissue restrictions, analyze and cue movement patterns, analyze and modify body mechanics/ergonomics, assess and modify postural abnormalities and instruct in home and community integration to attain remaining goals. Progress toward goals / Updated goals: · Short Term Goals: To be accomplished in  4  weeks:  1. Patient to be adherent to HEP to facilitate pain control with ADL's. Established HEP  2. Patient to report > 50% improvement in sleep interrupted by B Hip pain. 3. Patient to demonstrate ability to perform > 10 bridges to neutral to indicate increased posterior chain activation for performance in transfers/bed mobility. performing 10 reps at fair- form, no pain noted  · Long Term Goals: To be accomplished in  8  weeks:  1. Patient to be Safe and Independent with HEP to self-manage/prevent symptoms after DC. 2. Patient to increase FS FOTO score to > 60 to indicate increased functional independence. 3. Patient to demonstrate no difficulty negotiating stairs in reciprocal pattern. 4. Patient to report resumption of walking program with > 75% improvement in pain control.       PLAN  [x]  Upgrade activities as tolerated yes Continue plan of care   []  Discharge due to :    []  Other:      Therapist: Tyson Snow PTA    Date: 11/23/2021 Time: 10:52 AM     Future Appointments   Date Time Provider Rico Pena   11/26/2021 11:45 AM Lorri Bright L [facility]   11/29/2021 10:15 AM Joanne You, PT [facility]   12/3/2021 12:30 PM Ann Marie Knapp [facility]   12/7/2021  9:30 AM Joanne You, PT [facility]   12/9/2021  9:30 AM Joanne You, PT [facility]   12/13/2021 10:15 AM Bob Latif, PTA [facility]   12/15/2021 10:15 AM Bob Latif, PTA [facility]   12/20/2021  8:45 AM Bob Latif, PTA [facility]   12/22/2021  9:30 AM Joanne You, PT [facility]   12/27/2021  8:45 AM Bob Latif, PTA [facility]   12/29/2021  8:45 AM Cici Hough, PTA [facility]   12/30/2021  8:45 AM Mary Jimenez MD [facility]

## 2021-11-26 ENCOUNTER — HOSPITAL ENCOUNTER (OUTPATIENT)
Dept: PHYSICAL THERAPY | Age: 41
Discharge: HOME | End: 2021-11-26
Payer: OTHER GOVERNMENT

## 2021-11-26 PROCEDURE — 97530 THERAPEUTIC ACTIVITIES: CPT

## 2021-11-26 PROCEDURE — 97110 THERAPEUTIC EXERCISES: CPT

## 2021-11-26 NOTE — PROGRESS NOTES
PHYSICAL THERAPY - DAILY TREATMENT NOTE    Patient Name: Orin Kilpatrick        Date: 2021  : 1980   YES Patient  Verified  Visit #:   4     Insurance: Payor: ARIE / Plan: Kolby Arndt 74 / Product Type:  /      In time: 1150 Out time: 1240   Total Treatment Time: 50     Medicare/BCBS Time Tracking (below)   Total Timed Codes (min):  NA 1:1 Treatment Time:  NA     TREATMENT AREA =  Left hip pain [M25.552]  Right hip pain [M25.551]    SUBJECTIVE    Pain Level (on 0 to 10 scale):  2  / 10   Medication Changes/New allergies or changes in medical history, any new surgeries or procedures? NO    If yes, update Summary List   Subjective Functional Status/Changes:  []  No changes reported     Pt reports increased abdominal soreness secondary to trying to \"feel\" her abs with activities. Also reports being able to \"feel\" how weak her lateral hip musculature is. OBJECTIVE  Therapeutic Procedures:  Min Procedure Specifics + Rationale   (42) [x] Therapeutic Exercise    See Flowsheet   Rationale: increase ROM and increase strength to improve the patients ability to participate in ADL's      8 [x] Therapeutic Activity See Flow Sheet  Rationale: To improve safety, proprioception, coordination, and efficiency with tasks such as stair negotiation, transfers, bed mobility, and lifting mechanics             min Patient Education:  YES Reviewed HEP         Other Objective/Functional Measures:    See flowsheet for more details    Fair+ balance with Marches  Increased LAQ resistance to 4#  Added PTB to clams    min VC and demos required throughout session for proper form and increased safety         Post Treatment Pain Level (on 0 to 10) scale:   2  / 10     ASSESSMENT    Assessment/Changes in Function:     Piter Arredondo continues to respond respond well to treatment plan. No reports of increased symptoms within session however does report musculature fatigue.       []  See Progress Note/Recertification   Patient will continue to benefit from skilled PT services to analyze,, cue,, progress,, modify,, demonstrate,, instruct, and address, movement patterns,, therapeutic interventions,, postural abnormalities,, soft tissue restrictions,, ROM,, strength,, functional mobility,, body mechanics/ergonomics, and home and community integration, to attain remaining goals. Progress toward goals / Updated goals: · Short Term Goals: To be accomplished in  4  weeks:  1. Patient to be adherent to HEP to facilitate pain control with ADL's.  2. Patient to report > 50% improvement in sleep interrupted by B Hip pain. 3. Patient to demonstrate ability to perform > 10 bridges to neutral to indicate increased posterior chain activation for performance in transfers/bed mobility. MET: 10 Bridges Performed WNL  · Long Term Goals: To be accomplished in  8  weeks:  1. Patient to be Safe and Independent with HEP to self-manage/prevent symptoms after DC. 2. Patient to increase FS FOTO score to > 60 to indicate increased functional independence. 3. Patient to demonstrate no difficulty negotiating stairs in reciprocal pattern. 4. Patient to report resumption of walking program with > 75% improvement in pain control.       PLAN    [x]  Upgrade activities as tolerated YES Continue plan of care   []  Discharge due to :    []  Other:      Therapist: Tori Flores DPT    Date: 11/26/2021 Time: 11:52 AM     Future Appointments   Date Time Provider Rico Pena   11/29/2021 10:15 AM Idania Irene PT [facility]   12/3/2021 12:30 PM Alcides Albert [facility]   12/7/2021  9:30 AM Idania Irene PT [facility]   12/9/2021  9:30 AM Idania Irene PT [facility]   12/13/2021 10:15 AM Monserrat Barba PTA [facility]   12/15/2021 10:15 AM Monserrat Barba PTA [facility]   12/20/2021  8:45 AM Monserrat Barba PTA [facility]   12/22/2021  9:30 AM Idania Irene PT [facility]   12/27/2021  8:45 AM Yahir Dc, [place] [facility]   12/29/2021  8:45 AM Cici Hough PTA [facility]   12/30/2021  8:45 AM Mary Jimenez MD [facility] AM[facility]

## 2021-11-29 ENCOUNTER — HOSPITAL ENCOUNTER (OUTPATIENT)
Dept: PHYSICAL THERAPY | Age: 41
Discharge: HOME | End: 2021-11-29
Payer: OTHER GOVERNMENT

## 2021-11-29 PROCEDURE — 97110 THERAPEUTIC EXERCISES: CPT

## 2021-11-29 PROCEDURE — 97530 THERAPEUTIC ACTIVITIES: CPT

## 2021-11-29 NOTE — PROGRESS NOTES
PHYSICAL THERAPY - DAILY TREATMENT NOTE    Patient Name: Duke Saint        Date: 2021  : 1980   yes Patient  Verified  Visit #:   5     Insurance: Payor: ARIE / Plan: Grace Marcelino / Product Type:  /      In time: 71 Out time: 594   Total Treatment Time: 69     TREATMENT AREA =  Left hip pain [M25.552]  Right hip pain [M25.551]    SUBJECTIVE  Pain Level (on 0 to 10 scale): 3 / 10   Medication Changes/New allergies or changes in medical history, any new surgeries or procedures?    no  If yes, update Summary List   Subjective Functional Status/Changes:  []  No changes reported     \"I went to [employer], lights at [employer], and [employer]. I feel like I am a bit sore from it all. I am starting to feel my hips and diaphragm\"           OBJECTIVE    59  min Therapeutic Exercise:  [x]  See flow sheet   Rationale:      increase ROM, increase strength and improve coordination to improve the patients ability to safely perform ADLs, bending/stooping/ lifting; perform prolong sitting/standing/ambulation; and negotiate stairs with no pain or limitations        10 min Therapeutic Activity: [x]  See flow sheet   Rationale:    increase ROM, increase strength and improve coordination to improve the patients ability to safely perform ADLs, bending/stooping/ lifting; perform prolong sitting/standing/ambulation; and negotiate stairs with no pain or limitations       Billed With/As:   [x] TE   [x] TA   [] Neuro   [] Self Care Patient Education: [x] Review HEP    [] Progressed/Changed HEP based on:   [x] positioning   [x] body mechanics   [x] transfers   [] heat/ice application    [x] other: Pt ed on importance and benefits of compliance with HEP, core strength/stability and proper posture; pt verbalized understanding       Other Objective/Functional Measures:  VCs + demo to perform proper technique for TE  Initiated TE per flowsheet without c/o P!   CGA for HK amb x 60', VCs to increase JUDITH, decrease DF AROM, increase time to initiate gait  modified open book by performing elbow flexed verses extended      Post Treatment Pain Level (on 0 to 10) scale:  3  / 10     ASSESSMENT  Assessment/Changes in Function:   reduced left side t/s cramp with open book left rot  demos fair bridge form, progressing towards STG #3   []  See Progress Note/Recertification   Patient will continue to benefit from skilled PT services to modify and progress therapeutic interventions, address functional mobility deficits, address ROM deficits, address strength deficits, analyze and address soft tissue restrictions, analyze and cue movement patterns, analyze and modify body mechanics/ergonomics, assess and modify postural abnormalities and instruct in home and community integration to attain remaining goals. Progress toward goals / Updated goals: · Short Term Goals: To be accomplished in  4  weeks:  1. Patient to be adherent to HEP to facilitate pain control with ADL's. MET-compliant with HEP  2. Patient to report > 50% improvement in sleep interrupted by B Hip pain. 3. Patient to demonstrate ability to perform > 10 bridges to neutral to indicate increased posterior chain activation for performance in transfers/bed mobility. performing 10 reps at fair form, no pain noted  · Long Term Goals: To be accomplished in  8  weeks:  1. Patient to be Safe and Independent with HEP to self-manage/prevent symptoms after DC. 2. Patient to increase FS FOTO score to > 60 to indicate increased functional independence. 3. Patient to demonstrate no difficulty negotiating stairs in reciprocal pattern. 4. Patient to report resumption of walking program with > 75% improvement in pain control.  increased walking distance with less pain     PLAN  [x]  Upgrade activities as tolerated yes Continue plan of care   []  Discharge due to :    []  Other:      Therapist: Nikky Beyer PTA    Date: 11/29/2021 Time: 10:52 AM     Future Appointments   Date Time Provider Rico Becky   12/3/2021 12:30 PM Christiana Soto [facility]   12/7/2021  9:30 AM Tai Almeida, BANDAR [facility]   12/9/2021  9:30 AM Edmond Day, PT [facility]   12/13/2021 10:15 AM Tai Almeida, PTA [facility]   12/15/2021 10:15 AM Tai Almeida, PTA [facility]   12/20/2021  8:45 AM Tai Almeida, BANDAR [facility]   12/22/2021  9:30 AM Edmond Day, PT [facility]   12/27/2021  8:45 AM Tai Almeida, PTA [facility]   12/29/2021  8:45 AM Cici Hough, PTA [facility]   12/30/2021  8:45 AM Mary Jimenez MD [facility]

## 2021-12-03 ENCOUNTER — HOSPITAL ENCOUNTER (OUTPATIENT)
Dept: PHYSICAL THERAPY | Age: 41
Discharge: HOME | End: 2021-12-03
Payer: OTHER GOVERNMENT

## 2021-12-03 PROCEDURE — 97110 THERAPEUTIC EXERCISES: CPT

## 2021-12-03 PROCEDURE — 97530 THERAPEUTIC ACTIVITIES: CPT

## 2021-12-03 NOTE — PROGRESS NOTES
PHYSICAL THERAPY - DAILY TREATMENT NOTE    Patient Name: Shay Hess        Date: 12/3/2021  : 1980   YES Patient  Verified  Visit #:   6     Insurance: Payor:  / Plan: Isaac Baires / Product Type:  /      In time: 8465 Out time: 105   Total Treatment Time: 43     Medicare/BCBS Time Tracking (below)   Total Timed Codes (min):  NA 1:1 Treatment Time:  NA     TREATMENT AREA =  Left hip pain [M25.552]  Right hip pain [M25.551]    SUBJECTIVE    Pain Level (on 0 to 10 scale):  2  / 10   Medication Changes/New allergies or changes in medical history, any new surgeries or procedures? NO    If yes, update Summary List   Subjective Functional Status/Changes:  []  No changes reported     Reports being \"Really sore\" after last session primarily to lateral hip muscles. Compliance with HEP  Yes [] No []         OBJECTIVE  Therapeutic Procedures:  Min Procedure Specifics + Rationale   35() [x] Therapeutic Exercise    See Flowsheet   Rationale: increase ROM and increase strength to improve the patients ability to participate in ADL's      8 [x] Therapeutic Activity See Flow Sheet  Rationale:    To improve safety, proprioception, coordination, and efficiency with tasks such as stair negotiation, transfers, bed mobility, and lifting mechanics        min Patient Education:  YES Reviewed HEP         Other Objective/Functional Measures:    See flowsheet for more details        modified open book by performing elbow flexed verses extended          Min/mod VC and demos required throughout session for proper form and increased safety         Post Treatment Pain Level (on 0 to 10) scale:   0  / 10     ASSESSMENT    Assessment/Changes in Function:     Symptoms resolved by end of session     []  See Progress Note/Recertification   Patient will continue to benefit from skilled PT services to analyze,, cue,, progress,, modify,, demonstrate,, instruct, and address, movement patterns,, therapeutic interventions,, postural abnormalities,, soft tissue restrictions,, ROM,, strength,, functional mobility,, body mechanics/ergonomics, and home and community integration, to attain remaining goals. Progress toward goals / Updated goals:    1. Patient to be adherent to HEP to facilitate pain control with ADL's.  compliant  2. Patient to report > 50% improvement in sleep interrupted by B Hip pain. 3. Patient to demonstrate ability to perform > 10 bridges to neutral to indicate increased posterior chain activation for performance in transfers/bed mobility.    MET     PLAN    [x]  Upgrade activities as tolerated YES Continue plan of care   []  Discharge due to :    []  Other:      Therapist: Beranna Berrios DPT    Date: 12/3/2021 Time: 8:07 AM     Future Appointments   Date Time Provider Rico Pena   12/3/2021 12:30 PM Josie Larson [facility]   12/6/2021 12:30 PM Fuad December, PT [facility]   12/9/2021  9:30 AM Dendebbi Home, PT [facility]   12/13/2021 10:15 AM Carolynn Campos, BANDAR [facility]   12/15/2021 10:15 AM Bret Gonzalez [facility]   12/20/2021  8:45 AM Carolynn Campos PTA [facility]   12/22/2021  9:30 AM Dendebbi Home, PT [facility]   12/27/2021  8:45 AM Carolynn Campos PTA [facility]   12/29/2021  8:45 AM Cici Hough PTA [facility]   12/30/2021  8:45 AM Mary Jimenez MD [facility]

## 2021-12-06 ENCOUNTER — HOSPITAL ENCOUNTER (OUTPATIENT)
Dept: PHYSICAL THERAPY | Age: 41
Discharge: HOME | End: 2021-12-06
Payer: OTHER GOVERNMENT

## 2021-12-06 PROCEDURE — 97530 THERAPEUTIC ACTIVITIES: CPT

## 2021-12-06 PROCEDURE — 97110 THERAPEUTIC EXERCISES: CPT

## 2021-12-06 NOTE — PROGRESS NOTES
PHYSICAL THERAPY - DAILY TREATMENT NOTE    Patient Name: Kayli Cosme        Date: 2021  : 1980   YES Patient  Verified  Visit #:     Insurance: Payor: ARIE / Plan: Kolby Arndt 74 / Product Type:  /      In time: 12:20 Out time: 1:13   Total Treatment Time: 53     TREATMENT AREA = Left hip pain [M25.552]  Right hip pain [M25.551]    SUBJECTIVE    Pain Level (on 0 to 10 scale):  0  / 10   Medication Changes/New allergies or changes in medical history, any new surgeries or procedures? NO    If yes, update Summary List   Subjective Functional Status/Changes:  []  No changes reported     \"I've improved a lot since my first day I saw you. I feel a lot stronger. I'm good today, just really tired from my MG( Myasthenia Gravis)\"          OBJECTIVE    Therapeutic Procedures:  Min Procedure Specifics + Rationale   n/a [x]  Patient Education (performed throughout session) [x] Review HEP    [] Progressed/Changed HEP based on:   [] proper performance and advancement of Therex/TA   [] reduction in pain level    [] increased functional capacity       [] change in directional preference   38 [x] Therapeutic Exercise   [x]  See Flowsheet   Rationale: increase ROM and increase strength to improve the patients ability to participate in ADL's    15 [x] Therapeutic Activity   [x]  See Flowsheet  Rationale: To improve safety, proprioception, coordination, and efficiency with tasks       [x] Skin assessment post-treatment:  [x]intact [x]redness- no adverse reaction       []redness - adverse reaction:     Other Objective/Functional Measures:    Increased reps/sets/resistance per flow sheet.   Patient requested to hold from [facility] ambulation due to report of MG \"messing with her coordination\"     Post Treatment Pain Level (on 0 to 10) scale:   0  / 10     ASSESSMENT    Assessment/Changes in Function:     Performed/participated in treatment well without complaints aside from muscular fatigue/deconditioning, indicating (+) response to current course of treatment to further improve functional status. Patient will continue to benefit from skilled PT services to modify and progress therapeutic interventions, address functional mobility deficits, address ROM deficits, address strength deficits, analyze and address soft tissue restrictions, analyze and cue movement patterns, analyze and modify body mechanics/ergonomics and instruct in home and community integration  to attain remaining goals   Progress toward goals / Updated goals:    Excellent progress since [facility] in increasing standing tolerance     PLAN    [x]  Upgrade activities as tolerated  [x]  Update interventions per flow sheet YES Continue plan of care   []  Discharge due to :    []  Other:      Therapist: Michael Chu \"BJ\" [address], DPT, Sharyn Black. MDT, Cert. DN, Cert. SMT, Dip.  Osteopractic    Date: 12/6/2021 Time: 12:43 PM     Future Appointments   Date Time Provider Rico Pena   12/9/2021  9:30 AM Nathaniel Tamayo, PT [facility]   12/13/2021 10:15 AM Aditi Hassan PTA [facility]   12/15/2021 10:15 AM Aditi Hassan PTA [facility]   12/20/2021  8:45 AM Aditi Hassan PTA [facility]   12/22/2021  9:30 AM Nathaniel Tamayo, PT [facility]   12/27/2021  8:45 AM Aditi Hassan PTA [facility]   12/29/2021  8:45 AM Cici Hough PTA [facility]   12/30/2021  8:45 AM Mary Jimenez MD [facility]

## 2021-12-07 ENCOUNTER — APPOINTMENT (OUTPATIENT)
Dept: PHYSICAL THERAPY | Age: 41
End: 2021-12-07
Payer: OTHER GOVERNMENT

## 2021-12-09 ENCOUNTER — HOSPITAL ENCOUNTER (OUTPATIENT)
Dept: PHYSICAL THERAPY | Age: 41
Discharge: HOME | End: 2021-12-09
Payer: OTHER GOVERNMENT

## 2021-12-09 PROCEDURE — 97112 NEUROMUSCULAR REEDUCATION: CPT

## 2021-12-09 PROCEDURE — 97530 THERAPEUTIC ACTIVITIES: CPT

## 2021-12-09 PROCEDURE — 97110 THERAPEUTIC EXERCISES: CPT

## 2021-12-09 NOTE — PROGRESS NOTES
PHYSICAL THERAPY - DAILY TREATMENT NOTE    Patient Name: Felecia Patino        Date: 2021  : 1980   YES Patient  Verified  Visit #:   8     Insurance: Payor: ARIE / Plan: Kolby Arndt 74 / Product Type:  /      In time: 9:30 Out time: 10:20   Total Treatment Time: 50     Medicare/BCBS [place] Time Tracking (below)   Total Timed Codes (min):  NA 1:1 Treatment Time:  NA     TREATMENT AREA =  B hips     SUBJECTIVE    Pain Level (on 0 to 10 scale):  2  / 10   Medication Changes/New allergies or changes in medical history, any new surgeries or procedures? NO    If yes, update Summary List   Subjective Functional Status/Changes:  []  No changes reported     \"My low back and hips are hurting a bit today. Im stressed with finals\"          OBJECTIVE    30 min Therapeutic Exercise:  [x]  See flow sheet   Rationale:      increase ROM to improve the patients ability to perofrm ADLs and amb with increased ease      10 min Therapeutic Activity: Sit<>stnads and side steps   Rationale:   Increase ease with transfers and amb  10 min Neuromuscular Re-ed: Balance on DD for trunk kinesthetic sense   Rationale:   above   min Patient Education:  YES  Reviewed HEP   []  Progressed/Changed HEP based on:   Cont HEP     Other Objective/Functional Measures:    Sitting with L/S roll on NS decreased L/S pain  Changed LAQ to sitting on DD for lumbopelvic control   VC for standing hip abd an dext to pull up in standing limb as pt demo sinking into hip on left      Post Treatment Pain Level (on 0 to 10) scale:   0  / 10     ASSESSMENT    Assessment/Changes in Function:     Pt enthusiastic regarding importance of kinesthetic sense.  Responded well to sit,>stand cues for \"hovering over toilet\" and TA draw \"laughing\" to recruit contraction      []  See Progress Note/Recertification   Patient will continue to benefit from skilled PT services to analyze, cue, progress, modify,, demonstrate, instruct, and address, movement patterns, therapeutic interventions, postural abnormalities, soft tissue restrictions, ROM, strength, functional mobility, body mechanics/ergonomics, and home and community integration, to attain remaining goals.    Progress toward goals / Updated goals:    Nearing reassessment     PLAN    []  Upgrade activities as tolerated YES Continue plan of care   []  Discharge due to :    []  Other:      Therapist: Jamie Hoyos DPT    Date: 12/9/2021 Time: 10:13 AM     Future Appointments   Date Time Provider Rico Pena   12/13/2021 10:15 AM Bee Rosenberg PTA [facility]   12/15/2021 10:15 AM Bee Rosenberg PTA [facility]   12/20/2021  8:45 AM Bee Rosenberg PTA [facility]   12/22/2021  9:30 AM Rg Luther PT [facility]   12/27/2021  8:45 AM Bee Rosenberg PTA [facility]   12/29/2021  8:45 AM Cici Hough, B[facility]AR [facility]   12/30/2021  8:45 AM Mary Jimenez MD [facility] AM[facility]

## 2021-12-13 ENCOUNTER — HOSPITAL ENCOUNTER (OUTPATIENT)
Dept: PHYSICAL THERAPY | Age: 41
Discharge: HOME | End: 2021-12-13
Payer: OTHER GOVERNMENT

## 2021-12-13 PROCEDURE — 97530 THERAPEUTIC ACTIVITIES: CPT

## 2021-12-13 PROCEDURE — 97110 THERAPEUTIC EXERCISES: CPT

## 2021-12-13 NOTE — PROGRESS NOTES
PHYSICAL THERAPY - DAILY TREATMENT NOTE    Patient Name: Haylie Barber        Date: 2021  : 1980   yes Patient  Verified  Visit #:   9     Insurance: Payor: ARIE / Plan: Pauline Bonilla / Product Type:  /      In time: 9679 Out time: 110   Total Treatment Time: 56     TREATMENT AREA =  Left hip pain [M25.552]  Right hip pain [M25.551]    SUBJECTIVE  Pain Level (on 0 to 10 scale): 0 / 10   Medication Changes/New allergies or changes in medical history, any new surgeries or procedures?    no  If yes, update Summary List   Subjective Functional Status/Changes:  []  No changes reported     \"I am sore from the weekend. I did a cookie swap and it was fun.  I also \"           OBJECTIVE    46  min Therapeutic Exercise:  [x]  See flow sheet   Rationale:      increase ROM, increase strength and improve coordination to improve the patients ability to safely perform ADLs, bending/stooping/ lifting; perform prolong sitting/standing/ambulation; and negotiate stairs with no pain or limitations        10 min Therapeutic Activity: [x]  See flow sheet   Rationale:    increase ROM, increase strength and improve coordination to improve the patients ability to safely perform ADLs, bending/stooping/ lifting; perform prolong sitting/standing/ambulation; and negotiate stairs with no pain or limitations       Billed With/As:   [x] TE   [x] TA   [] Neuro   [] Self Care Patient Education: [x] Review HEP    [] Progressed/Changed HEP based on:   [x] positioning   [x] body mechanics   [x] transfers   [] heat/ice application    [x] other: Pt ed on importance and benefits of compliance with HEP, core strength/stability and proper posture; pt verbalized understanding       Other Objective/Functional Measures:  VCs + demo to perform proper technique for TE    performed HS str at stairs with no difficulty     difficulty keeping upright posture on DD due to decrease trunk strength   Post Treatment Pain Level (on 0 to 10) scale:  0 / 10     ASSESSMENT  Assessment/Changes in Function:   required standing rest break with hip abd/ext at ~ 5 reps due to fatigue  demos fair+ bridge form, progressing towards STG #3   []  See Progress Note/Recertification   Patient will continue to benefit from skilled PT services to modify and progress therapeutic interventions, address functional mobility deficits, address ROM deficits, address strength deficits, analyze and address soft tissue restrictions, analyze and cue movement patterns, analyze and modify body mechanics/ergonomics, assess and modify postural abnormalities and instruct in home and community integration to attain remaining goals. Progress toward goals / Updated goals: · Short Term Goals: To be accomplished in  4  weeks:  1. Patient to be adherent to HEP to facilitate pain control with ADL's. MET-compliant with HEP  2. Patient to report > 50% improvement in sleep interrupted by B Hip pain. 3. Patient to demonstrate ability to perform > 10 bridges to neutral to indicate increased posterior chain activation for performance in transfers/bed mobility. fair+ form  · Long Term Goals: To be accomplished in  8  weeks:  1. Patient to be Safe and Independent with HEP to self-manage/prevent symptoms after DC. 2. Patient to increase FS FOTO score to > 60 to indicate increased functional independence. 3. Patient to demonstrate no difficulty negotiating stairs in reciprocal pattern. 4. Patient to report resumption of walking program with > 75% improvement in pain control.  increased walking distance with less pain     PLAN  [x]  Upgrade activities as tolerated yes Continue plan of care   []  Discharge due to :    []  Other:      Therapist: Adarsh Carcamo PTA    Date: 12/13/2021 Time: 1:08 PM     Future Appointments   Date Time Provider Rico Pena   12/15/2021 10:15 AM Felicia Rapp [facility]   12/20/2021  8:45 AM Dinora Ni PTA [facility]   12/22/2021 9:30 AM Sean Torres, PT [facility]   12/27/2021  8:45 AM Yahir Dc PTA [facility]   12/29/2021  8:45 AM [facility]   12/30/2021  8:45 AM Mary Jimenez MD [facility] AM[facility]

## 2021-12-15 ENCOUNTER — APPOINTMENT (OUTPATIENT)
Dept: PHYSICAL THERAPY | Age: 41
End: 2021-12-15
Payer: OTHER GOVERNMENT

## 2021-12-20 ENCOUNTER — HOSPITAL ENCOUNTER (OUTPATIENT)
Dept: PHYSICAL THERAPY | Age: 41
Discharge: HOME | End: 2021-12-20
Payer: OTHER GOVERNMENT

## 2021-12-20 PROCEDURE — 97110 THERAPEUTIC EXERCISES: CPT

## 2021-12-20 PROCEDURE — 97530 THERAPEUTIC ACTIVITIES: CPT

## 2021-12-20 NOTE — PROGRESS NOTES
[facility] PHYSICAL THERAPY  [address] [place] [address] Nimisha Wyman, [address] - Phone: [phone]  Fax: [phone]  PROGRESS NOTE  Patient Name: Manuel Stephenson : 1980   Treatment/Medical Diagnosis: Left hip pain [M25.552]  Right hip pain [M25.551]   Referral Source: Juan Ramon Mclaughlin*     Date of Initial Visit: 21 Attended Visits: 9 Missed Visits: 1     SUMMARY OF TREATMENT  Patient's POC has consisted of therex, therapeutic activities, manual therapy prn, modalities prn, pt. education, and a comprehensive HEP. Treatment strategies used to address functional mobility deficits, ROM deficits, strength deficits, analyze and address soft tissue restrictions, analyze and cue movement patterns, analyze and modify body mechanics/ergonomics, assess and modify postural abnormalities and instruct in home and community integration. CURRENT STATUS  Patient making slow but steady progress in improving functional strength and ADL tolerance. She has increased uninterrupted standing activity tolerance, with her Myasthenia Gracis being a primary limiter vs her B hip pain. She had a recent flare up of LBP after having to change a tire in the rain and later the same evening attempting to change a smoke detector battery in the middle of the night. Treatment protocol was regressed to account for her exacerbation, and was able to reduce symptoms. Goal/Measure of Progress Goal Met? 1. Patient to be adherent to HEP to facilitate pain control with ADL's.  2. Patient to report > 50% improvement in sleep interrupted by B Hip pain. 3. Patient to demonstrate ability to perform > 10 bridges to neutral to indicate increased posterior chain activation  MET    MET    MET     New Goals to be achieved in __4__  weeks:  1. Patient to be Safe and Independent with HEP to self-manage/prevent symptoms after DC.   2. Patient to increase FS FOTO score to > 60 to indicate increased functional independence. 3. Patient to demonstrate no difficulty negotiating stairs in reciprocal pattern. 4. Patient to report resumption of walking program with > 75% improvement in pain control. Frequency / Duration:   Patient to be seen  2  times per week for 4  weeks:    RECOMMENDATIONS  Continue and progress functional therex/therapeutic activity as able, utilizing manual therapy and modalities prn. Progress patient towards independent HEP to facilitate self-management of symptoms and progress gains after PT. If you have any questions/comments please contact us directly at [phone]. Thank you for allowing us to assist in the care of your patient. Therapist Signature: John Mosley \"BJ\" Mayito Edward DPT, Cert. MDT, Cert. DN, Cert. SMT, Dip. Osteopractic Date: 81/13/0976   Certification Period: n/a     Reporting Period   n/a Time: 9:32 AM   NOTE TO PHYSICIAN:  PLEASE COMPLETE THE ORDERS BELOW AND FAX TO   [facility] Physical Therapy: [phone]. If you are unable to process this request in 24 hours please contact our office: [phone].    ___ I have read the above report and request that my patient continue as recommended.   ___ I have read the above report and request that my patient continue therapy with the following changes/special instructions:_________________________________________________________   ___ I have read the above report and request that my patient be discharged from therapy.      Physician Signature:        Date:       Time:                                        Wash Mech

## 2021-12-20 NOTE — PROGRESS NOTES
PHYSICAL THERAPY - DAILY TREATMENT NOTE    Patient Name: Angelina Hunt        Date: 2021  : 1980   YES Patient  Verified  Visit #:   10   of   12  Insurance: Payor: ARIE / Plan: Ronda Mak / Product Type:  /      In time: 8:24 Out time: 9:25   Total Treatment Time: 61     TREATMENT AREA = Left hip pain [M25.552]  Right hip pain [M25.551]    SUBJECTIVE    Pain Level (on 0 to 10 scale):  4  / 10   Medication Changes/New allergies or changes in medical history, any new surgeries or procedures? NO    If yes, update Summary List   Subjective Functional Status/Changes:  []  No changes reported     \"I was sore all weekend. It was rough. My son has allergies and when one of them got his shot, he had anaphylactic shock. \"          OBJECTIVE    Therapeutic Procedures:  Min Procedure Specifics + Rationale   n/a [x]  Patient Education (performed throughout session) [x] Review HEP    [] Progressed/Changed HEP based on:   [] proper performance and advancement of Therex/TA   [] reduction in pain level    [] increased functional capacity       [] change in directional preference   40 [x] Therapeutic Exercise   [x]  See Flowsheet   Rationale: increase ROM and increase strength to improve the patients ability to participate in ADL's    11 [x] Therapeutic Activity   [x]  See Flowsheet  Re-assessment  Rationale:  To improve safety, proprioception, coordination, and efficiency with tasks     Modality rationale: decrease inflammation, decrease pain, increase tissue extensibility and increase muscle contraction/control to improve the patients ability to perform ADL's with greater ease     Min Type Additional Details   10 [x]  Heat         [] pre-MARCELA      [x] post-MARCELA Location:L:/S    [x] supine             [] prone     [x] legs elevated  [] legs flat  [] sitting              [] sidelying - [] left [] right   [x] Skin assessment post-treatment:  [x]intact [x]redness- no adverse reaction []redness - adverse reaction:     Other Objective/Functional Measures:    See PN     Post Treatment Pain Level (on 0 to 10) scale:   0  / 10     ASSESSMENT    Assessment/Changes in Function:     See PN         Patient will continue to benefit from skilled PT services to modify and progress therapeutic interventions, address functional mobility deficits, address ROM deficits, address strength deficits, analyze and address soft tissue restrictions, analyze and cue movement patterns, analyze and modify body mechanics/ergonomics and instruct in home and community integration  to attain remaining goals   Progress toward goals / Updated goals:    See PN     PLAN    [x]  Upgrade activities as tolerated  [x]  Update interventions per flow sheet YES Continue plan of care   []  Discharge due to :    []  Other:      Therapist: Harriet Rojas \"BJ\" [address] [place] [address] Drive, DPT, Cert. MDT, Cert. DN, Cert. SMT, Dip.  Osteopractic    Date: 12/20/2021 Time: 9:28 AM     Future Appointments   Date Time Provider Rico Pena   12/20/2021  9:30 AM Angelika Eli, PT [facility]   12/22/2021  9:30 AM Darrius Vann, PT [facility]   12/27/2021  8:45 AM Bora Morales PTA [facility]   12/29/2021  8:45 AM [facility]ales [facility]   12/30/2021  8:45 AM Mary Warren MD [facility] AM[facility]

## 2021-12-22 ENCOUNTER — APPOINTMENT (OUTPATIENT)
Dept: PHYSICAL THERAPY | Age: 41
End: 2021-12-22
Payer: OTHER GOVERNMENT

## 2021-12-27 ENCOUNTER — APPOINTMENT (OUTPATIENT)
Dept: PHYSICAL THERAPY | Age: 41
End: 2021-12-27
Payer: OTHER GOVERNMENT

## 2021-12-29 ENCOUNTER — HOSPITAL ENCOUNTER (OUTPATIENT)
Dept: PHYSICAL THERAPY | Age: 41
End: 2021-12-29
Payer: OTHER GOVERNMENT

## 2021-12-30 ENCOUNTER — OFFICE VISIT (OUTPATIENT)
Dept: ORTHOPEDIC SURGERY | Age: 41
End: 2021-12-30
Payer: OTHER GOVERNMENT

## 2021-12-30 VITALS — TEMPERATURE: 97 F

## 2021-12-30 DIAGNOSIS — M70.62 GREATER TROCHANTERIC BURSITIS OF BOTH HIPS: Primary | ICD-10-CM

## 2021-12-30 DIAGNOSIS — M70.61 GREATER TROCHANTERIC BURSITIS OF BOTH HIPS: Primary | ICD-10-CM

## 2021-12-30 PROCEDURE — 99213 OFFICE O/P EST LOW 20 MIN: CPT | Performed by: PHYSICAL MEDICINE & REHABILITATION

## 2021-12-30 RX ORDER — SUMATRIPTAN 25 MG/1
25 TABLET, FILM COATED ORAL
COMMUNITY

## 2021-12-30 RX ORDER — TOPIRAMATE 50 MG/1
TABLET, FILM COATED ORAL 2 TIMES DAILY
COMMUNITY

## 2021-12-30 NOTE — PROGRESS NOTES
[address]  [address], [address]  [facility], [address]  Phone: [phone]  Fax: [phone]      Patient: Orin Kilpatrick                                                                              MRN: [id number]        YOB: 1980          AGE: 42 y.o. PCP: Dev Tellez MD  Date:  12/30/21    Reason for Consultation: Follow-up (bilateral hip burstis)      HPI:  Orin Kilpatrick is a 42 y.o. female with relevant PMH of a large cervical meningioma C4-8 causing severe compression dx in 2017 s/p resection and laminoplasty 8/31/2017-Dr. Elisabet Roche. She did inpatient rehabilitation at [facility] and gradually regained most of her strength and her ability to walk. She does have some mild residual right lower extremity and left upper extremity as well as spasticity. She has reduced sensation in her right leg. She went to pelvic floor PT which helped with voiding. She developed myasthenia gravis after her spine surgery. Overall she had been doing well but she has been trying to walk more and lose weight but has noticed increased pain in b/l lateral hips and in her coccyx. While doing pelvic floor PT years ago she was told her coccyx felt like it was not aligned. Since her last visit we tried bilateral GT bursa injections which helped and she has been in PT working on gluteal strengthening which has been very helpful. Neurologic symptoms: + scattered numbness, tingling, weakness, SCI - did pelvic floor PT bladder changes. No bowel issues but consitpation. No recent falls      Location: The pain is located in the coccyx, into the hips  Radiation: The pain does radiate into b/l lateral hips . Pain Score: Currently: 2/10   Quality: Pain is of a Achy, Cramping, Stiff, Tight and Pulling quality. Aggravating: Pain is exacerbated by walking, sitting and standing  Alleviating:  The pain is alleviated by lying down    Prior Treatments:   Physical therapy [facility], [place], operated by [facility]   Pelvic floor PT in 2018[facility]   Previous Medications:  Baclofen stopped due to asthma, flexeril- tired  Current Medications: valium 5mg bid, gabapentin 600mg TID  Previous work-up has included:   MRI cervical spine 2018  No evidence of recurrent or residual meningioma   2. Operative region cord deformity, gliosis   -Interval diminished or resolved cord edema   -Small retrospinous fluid collection, innocuous appearing, interval smaller   3. Mild degenerative central spinal stenosis   4. Mild diffuse cervical lymphadenopathy: abnormally increased number of normal-sized lymph nodes diffusely at anterior and posterior chains. Stable back to . Nonspecific and very probably benign     MRI lumbar spine 2017  Normal anatomic alignment of the lumbar spine.  Vertebral body hemangioma noted at L2 posteriorly, 1.7 cm.  Additional tiny vertebral body hemangioma at L5 vertebral body comment 6 mm.  No infiltrative marrow replacement process or marrow edema.  Incidentally imaged retroperitoneum and paraspinous soft tissues demonstrate no acute abnormalities. Mild degenerative facet hypertrophy at L3/L4, L4/L5 and L5/S1 levels, and mild bulging of the disc at L3/L4 and L4/L5 noted, without central canal or foraminal compromise.     Past Medical History:   Past Medical History:   Diagnosis Date    Asthma     Bladder wall thickening     Clonus     Ill-defined condition     spinal cord  compression    Incomplete bladder emptying     Myasthenia gravis (HCC)     Neurogenic bladder     Obesity     MARIAH on CPAP     uses cpap    Stress incontinence     Vitamin D deficiency       Past Surgical History:   Past Surgical History:   Procedure Laterality Date    HX  SECTION      x3    HX COLONOSCOPY      HX ORTHOPAEDIC      HX OTHER SURGICAL  2017    C-4 meningioma resection    HX WISDOM TEETH EXTRACTION        SocHx:   Social History     Tobacco Use    Smoking status: Former Smoker    Smokeless tobacco: Former User   Substance Use Topics    Alcohol use: Yes     Comment: occ      FamHx:? Family History   Problem Relation Age of Onset    Hypertension Mother     Seizures Sister     Stroke Sister        Current Medications:    Current Outpatient Medications   Medication Sig Dispense Refill    topiramate (Topamax) 50 mg tablet Take  by mouth two (2) times a day.  SUMAtriptan (Imitrex) 25 mg tablet Take 25 mg by mouth once as needed for Migraine.  mycophenolate (CELLCEPT) 500 mg tablet Take 500 mg by mouth two (2) times a day.  albuterol (PROVENTIL HFA, VENTOLIN HFA, PROAIR HFA) 90 mcg/actuation inhaler Take  by inhalation every six (6) hours as needed for Wheezing.  diazePAM (VALIUM) 5 mg tablet Take 1 Tab by mouth daily as needed (severe muscle spasm). 9 Tab 0    buPROPion XL (WELLBUTRIN XL) 300 mg XL tablet take 1 tablet by mouth once daily  0    pyridostigmine (MESTINON) 60 mg tablet START WITH take 1/2 tablet by mouth three times a day for 1 week . ..  (REFER TO PRESCRIPTION NOTES). 0    diclofenac (VOLTAREN) 1 % gel every six (6) hours. 0    cetirizine (ZYRTEC) 10 mg tablet daily as needed.  fluticasone (FLONASE) 50 mcg/actuation nasal spray       gabapentin (NEURONTIN) 600 mg tablet three (3) times daily. 0    tranexamic acid (LYSTEDA) 650 mg tab tablet WHEN ON MENSTRUAL CYCLE      multivitamin with minerals (MULTIVITAMIN & MINERAL FORMULA PO) multivitamin      PAZEO 0.7 % drop as needed.  carboxymethylcellulose sodium (REFRESH TEARS) 0.5 % drop ophthalmic solution instill 1 drop into affected eye if needed      fluticasone propion-salmeteroL (Advair Diskus) 100-50 mcg/dose diskus inhaler Take 1 Puff by inhalation every twelve (12) hours. (Patient not taking: Reported on 12/30/2021)      ergocalciferol (ERGOCALCIFEROL) 50,000 unit capsule every seven (7) days. Allergies:     Allergies   Allergen Reactions  Other Food Shortness of Breath     Milk protein, casein, whey--throat swelling        Review of Systems:   Gen:    Denied fevers, chills, malaise, fatigue, weight changes   Resp: Denied shortness of breath, cough, wheezing   CVS: Denied chest pain, palpitations   : Denied urinary urgency, frequency, incontinence   GI: Denied nausea, vomiting, constipation, diarrhea   Skin: Denied rashes, wounds   Psych: Denied anxiety, depression   Vasc: Denied claudication, ulcers   Hem: Denied easy bruising/bleeding   MSK: See HPI   Neuro: See HPI         Physical Exam     Vital Signs:   Visit Vitals  Temp 97 °F (36.1 °C)      General: ??????? Well nourished and well developed female without any acute distress   Psychiatric: ?  Alert and oriented x 3 with normal mood    HEENT: ???????? Atraumatic   Respiratory:   Breathing non-labored and non dyspneic   CV: ???????????????? Peripheral pulses intact, no peripheral edema   Skin: ????????????? No rashes       Neurologic: ??       Sensation: normal and grossly intact thebilateral, upper extremity(s), lower extremity(s) except diminished right lower extremity    Strength: 5/5 in the bilateral, upper extremity(s), lower extremity(s) except left wrist flexion 4/5, finger abduction 4/5 , right DF 4/.5  Reflexes: reveals 2+ symmetric DTRs throughout   Gait: normal and difficulty with tandem gait    Musculoskeletal: Hip Exam      Alignment: Normal   Atrophy: None   Single leg stance: Normal    Tenderness to Palpation:   Anterior hip: Negative  Adductors: Negative  Greater trochanter: Negative b/l  IT Band: Negative  Gluteal:Positive b/l  SI Joint:  Negative  Lumbar paraspinals or spinous processes: Negative   + tenderness at the coccyx    ROM:   Hip Instability: None   Hip ROM: Normal tight  Lumbar ROM: No reproduction of pain with movement     Special Tests    Stinchfield: Negative  Log Roll: Negative  Scour:  Negative  KUSHAL: Negative  FADIR: Negative  Shelby's:Negative  SI joint compression: Negative  Gaenslens: Negative  Slump test, femoral stretch, SLR: Negative  Tight hamstrings hip flexors, quads      Medical Decision Making:    Images: The imaging results    Reviewed prior MRI cervical spine 2018, lumbar spine 2017  Reviewed prior NSGY notes      Assessment:     -bilateral greater trochanteric bursitis with gluteal tendinopathy  - coccyx pain  - prior cervical spinal cord injury secondary to meningioma    Plan:      -Physical therapy -  Continue PT to work on gluteal strengthening, pelvic stabilization  -Medications - continue current medications. Counseled regarding side effects and appropriate administration of medications.    -Diagnostics/Imaging - x-ray sacrum coccyx    -Lifestyle -Encouraged continued weight loss    -Education - The patient's diagnosis, prognosis and treatment options were discussed today. All questions were answered.     F/U - as needed, she will let me know if she needs her PT extended      [facility]

## 2022-02-03 NOTE — PROGRESS NOTES
[address] PHYSICAL THERAPY  [address] [place] [address] Y[address] Wyman, [address] - Phone: [phone]  Fax: [phone]      Dear Dr. Yee Tinsley*,   Per your referral, Catalina Bence, 1980, was evaluated and treated for the diagnosis of Left hip pain [M25.552]  Right hip pain [M25.551]. The patient had not returned since PN on 12/20/22 and was unable to be contacted until recently. Patient reported being functionally independent and able to address recurrence of symptoms via her HEP. She requested to self DC at this time. If you have any questions/comments please contact us directly at [phone]. Thank you for allowing us to assist in the care of your patient. Therapist Signature: Shagufta Hurst DPT, Cert. MDT, Cert. DN, Cert. SMT, Dip. Osteopractic Date: 8/8/4639   Certification Period n/a Time: 9:05 AM   Reporting Period n/a     NOTE TO PHYSICIAN:  PLEASE COMPLETE THE ORDERS BELOW AND FAX TO   [facility]: [phone]  If you are unable to process this request in 24 hours please contact our office: [phone]    ___ I have read the above report and request that my patient continue as recommended.   ___ I have read the above report and request that my patient continue therapy with the following changes/special instructions:_________________________________________________________   ___ I have read the above report and request that my patient be discharged from therapy.      Physician Signature:        Date:       Time:                                       Yee Tinsley